# Patient Record
Sex: FEMALE | Race: WHITE | NOT HISPANIC OR LATINO | Employment: OTHER | URBAN - METROPOLITAN AREA
[De-identification: names, ages, dates, MRNs, and addresses within clinical notes are randomized per-mention and may not be internally consistent; named-entity substitution may affect disease eponyms.]

---

## 2017-01-03 ENCOUNTER — TELEPHONE (OUTPATIENT)
Dept: INTERNAL MEDICINE | Facility: CLINIC | Age: 82
End: 2017-01-03

## 2017-01-03 NOTE — TELEPHONE ENCOUNTER
Pt states sore throat started Friday night and hacking cough started this am, afraid it's going to go down into her chest, no temp, wants something called in. Pt notified she would need to come in as she's high risk per TGF.

## 2017-01-03 NOTE — TELEPHONE ENCOUNTER
----- Message from Hossein Winters sent at 1/3/2017 12:24 PM EST -----  Contact: Quita Kelly Complaint: Red, Sore Throat, tonsils swollen, coughing, stopped up, now runny nose, pls call something in to [facility] in [place].  Quita [phone]

## 2017-01-10 RX ORDER — DIAZEPAM 5 MG/1
2.5-5 TABLET ORAL NIGHTLY PRN
Qty: 30 TABLET | Refills: 0 | OUTPATIENT
Start: 2017-01-10 | End: 2017-02-10 | Stop reason: SDUPTHER

## 2017-01-12 ENCOUNTER — OFFICE VISIT (OUTPATIENT)
Dept: INTERNAL MEDICINE | Facility: CLINIC | Age: 82
End: 2017-01-12

## 2017-01-12 ENCOUNTER — APPOINTMENT (OUTPATIENT)
Dept: LAB | Facility: HOSPITAL | Age: 82
End: 2017-01-12
Attending: INTERNAL MEDICINE

## 2017-01-12 VITALS
BODY MASS INDEX: 24.8 KG/M2 | RESPIRATION RATE: 16 BRPM | TEMPERATURE: 98.9 F | OXYGEN SATURATION: 97 % | WEIGHT: 140 LBS | DIASTOLIC BLOOD PRESSURE: 80 MMHG | HEART RATE: 76 BPM | SYSTOLIC BLOOD PRESSURE: 144 MMHG

## 2017-01-12 DIAGNOSIS — F41.1 GENERALIZED ANXIETY DISORDER: ICD-10-CM

## 2017-01-12 DIAGNOSIS — E78.2 MIXED HYPERLIPIDEMIA: ICD-10-CM

## 2017-01-12 DIAGNOSIS — I10 ESSENTIAL HYPERTENSION: Primary | ICD-10-CM

## 2017-01-12 DIAGNOSIS — K29.30 CHRONIC SUPERFICIAL GASTRITIS WITHOUT BLEEDING: ICD-10-CM

## 2017-01-12 DIAGNOSIS — E11.9 TYPE 2 DIABETES MELLITUS WITHOUT COMPLICATION, WITHOUT LONG-TERM CURRENT USE OF INSULIN (HCC): ICD-10-CM

## 2017-01-12 LAB
ALBUMIN SERPL-MCNC: 4.6 G/DL (ref 3.2–4.8)
ALBUMIN/GLOB SERPL: 1.8 G/DL (ref 1.5–2.5)
ALP SERPL-CCNC: 84 U/L (ref 25–100)
ALT SERPL W P-5'-P-CCNC: 16 U/L (ref 7–40)
ANION GAP SERPL CALCULATED.3IONS-SCNC: 13 MMOL/L (ref 3–11)
ARTICHOKE IGE QN: 129 MG/DL (ref 0–130)
AST SERPL-CCNC: 28 U/L (ref 0–33)
BILIRUB SERPL-MCNC: 0.6 MG/DL (ref 0.3–1.2)
BUN BLD-MCNC: 11 MG/DL (ref 9–23)
BUN/CREAT SERPL: 12.2 (ref 7–25)
CALCIUM SPEC-SCNC: 10.1 MG/DL (ref 8.7–10.4)
CHLORIDE SERPL-SCNC: 105 MMOL/L (ref 99–109)
CHOLEST SERPL-MCNC: 242 MG/DL (ref 0–200)
CO2 SERPL-SCNC: 27 MMOL/L (ref 20–31)
CREAT BLD-MCNC: 0.9 MG/DL (ref 0.6–1.3)
GFR SERPL CREATININE-BSD FRML MDRD: 59 ML/MIN/1.73
GLOBULIN UR ELPH-MCNC: 2.5 GM/DL
GLUCOSE BLD-MCNC: 102 MG/DL (ref 70–100)
HBA1C MFR BLD: 6.6 % (ref 4.8–5.6)
HDLC SERPL-MCNC: 77 MG/DL (ref 40–60)
POTASSIUM BLD-SCNC: 3.9 MMOL/L (ref 3.5–5.5)
PROT SERPL-MCNC: 7.1 G/DL (ref 5.7–8.2)
SODIUM BLD-SCNC: 145 MMOL/L (ref 132–146)
TRIGL SERPL-MCNC: 158 MG/DL (ref 0–150)

## 2017-01-12 PROCEDURE — 99214 OFFICE O/P EST MOD 30 MIN: CPT | Performed by: INTERNAL MEDICINE

## 2017-01-12 PROCEDURE — 83036 HEMOGLOBIN GLYCOSYLATED A1C: CPT | Performed by: INTERNAL MEDICINE

## 2017-01-12 PROCEDURE — 36415 COLL VENOUS BLD VENIPUNCTURE: CPT | Performed by: INTERNAL MEDICINE

## 2017-01-12 PROCEDURE — 80053 COMPREHEN METABOLIC PANEL: CPT | Performed by: INTERNAL MEDICINE

## 2017-01-12 PROCEDURE — 80061 LIPID PANEL: CPT | Performed by: INTERNAL MEDICINE

## 2017-01-12 NOTE — PROGRESS NOTES
Subjective   Kasey Bass is a 89 y.o. female.     History of Present Illness     The patient has many decades of severe refractory anxiety.  She has found that only Valium helps her control anxiety.  She was nicotine dependent until 1966 but is been off tobacco now for 50 years.  In the 1970s she began on Valium 5 mg and still feels this is the only dose that adequately helps her.  She has been offered SSRIs.  Declines to take these medications.  She has periods of peak anxiety that only the 5 mg Valium helps.  She has tried BuSpar but finds it ineffective.  She feels her sleep quality has been adequate without extra medication.  She has not had a dysphoric mood.  She has been stable at home with her  and support from her grown daughter.    The following portions of the patient's history were reviewed and updated as appropriate: allergies, current medications, past family history, past medical history, past social history, past surgical history and problem list.    Review of Systems   Constitutional: Negative for appetite change and fatigue.   HENT: Negative for ear pain and sore throat.    Respiratory: Positive for shortness of breath. Negative for cough and wheezing.         Mild intermittent asthma activity   Cardiovascular: Negative for chest pain and palpitations.   Gastrointestinal: Negative for abdominal pain and nausea.        Minimal GERD activity   Musculoskeletal: Negative for arthralgias, back pain and gait problem.   Neurological: Negative for dizziness, weakness and headaches.   Psychiatric/Behavioral: Positive for sleep disturbance. Negative for dysphoric mood. The patient is not nervous/anxious.        Objective   Blood pressure 144/80, pulse 76, temperature 98.9 °F (37.2 °C), temperature source Oral, resp. rate 16, weight 140 lb (63.5 kg), SpO2 97 %.    Physical Exam   Constitutional: She is oriented to person, place, and time. She appears well-developed and well-nourished. No distress.    Cardiovascular: Normal rate and regular rhythm.    Mild systolic murmur at apex   Pulmonary/Chest: Effort normal. She has no wheezes. She has no rales.   Breath sounds mildly depressed   Abdominal: Soft. Bowel sounds are normal.   Neurological: She is alert and oriented to person, place, and time. Coordination normal.   Skin: Skin is warm and dry. No rash noted.   Psychiatric: Her behavior is normal. Judgment and thought content normal.   Mild general anxiety   Nursing note and vitals reviewed.    Procedures  Assessment/Plan   Kasey was seen today for cough.    Diagnoses and all orders for this visit:    Essential hypertension    Generalized anxiety disorder    Chronic superficial gastritis without bleeding    Type 2 diabetes mellitus without complication, without long-term current use of insulin  -     Hemoglobin A1c    Mixed hyperlipidemia  -     Comprehensive Metabolic Panel  -     Lipid Panel      The patient has moderately severe anxiety.  The Valium doses and increasing risk to her day by day.  I've asked her to use a half a tablet as often as possible.  Another strategy will be to give her 2 mg tablets and have her limited dosing to 2 mg or 4 mg at a time.    The patient's chronic GERD symptoms are in reasonably good control.  Prevacid will pose an increasing risk for her with osteoporosis and memory disorders.  I've asked her to start dropping doses of Prevacid and will I on ranitidine and Pepto-Bismol for GI distress.    The patient's type 2 diabetes is in adequate control with a stable hemoglobin A1c of 6.6.  Her LDL cholesterol is not adequate at 129 and she is at high risk of stroke and heart attack.  We will discuss the addition of every other day atorvastatin next visit.    The patient's blood pressure appears to be in adequate control generally at 135-140.  We should consider a new trial of losartan at 25 mg daily due to her diabetes and her mitral valve disease.    Patient Instructions   1.  Start  diltiazem 120 mg daily - to improve blood pressure control.    2.  Start ranitidine 300 mg at bedtime - to suppress stomach acid.    3.  Decrease Prevacid 15 mg - Monday Wednesday Friday only - for long-term safety.    4.  Continue usual medicines and supplements - as listed.    5.  Follow well-balanced diet - with plenty of fruits and vegetables.  Limit sugary foods - one day weekly.    6.  The nurse will call with test results.    7.  Return in May - annual checkup fasting.    8.  Laboratory tests are acceptable and required no change in treatment.    9.  Consider trial on low-dose atorvastatin every other day next visit.    10.  Consider cutting Valium doses 2 mg to 4 mg when necessary.    Electronically signed Kamlesh Redding M.D.1/15/2017 12:19 PM

## 2017-01-12 NOTE — MR AVS SNAPSHOT
Kasey Bass   1/12/2017 2:00 PM   Office Visit    Provider:  Kamlesh Redding MD   Department:  [facility] INTERNAL MEDICINE   Dept Phone:  [phone]                Your Full Care Plan              Your Updated Medication List          This list is accurate as of: 1/12/17  2:59 PM.  Always use your most recent med list.                albuterol 108 (90 BASE) MCG/ACT inhaler   Commonly known as:  VENTOLIN HFA   Inhale 1 puff As Needed for wheezing (for cough, wheezing or shortness of breath).       aspirin 81 MG EC tablet       CALCIUM 500+D HIGH POTENCY 500-400 MG-UNIT per tablet   Generic drug:  Calcium Carbonate-Vitamin D       Co Q-10 200 MG capsule       diazePAM 5 MG tablet   Commonly known as:  VALIUM   Take 0.5-1 tablets by mouth At Night As Needed for anxiety.       diltiazem  MG 24 hr capsule   Commonly known as:  DILACOR XR   Take 1 capsule by mouth Daily.       FIBERCON 625 MG tablet   Generic drug:  polycarbophil       FISH OIL BURP-LESS 500 MG capsule       fluocinonide 0.05 % ointment   Commonly known as:  LIDEX       fluticasone 50 MCG/ACT nasal spray   Commonly known as:  FLONASE       lansoprazole 30 MG capsule   Commonly known as:  PREVACID       metFORMIN  MG 24 hr tablet   Commonly known as:  GLUCOPHAGE-XR   Take 1 tablet by mouth Every Morning.       MULTI VITAMIN DAILY tablet       nitroglycerin 0.4 MG SL tablet   Commonly known as:  NITROSTAT   Place 1 tablet under the tongue Every 5 (Five) Minutes As Needed for chest pain. Take no more than 3 doses in 15 minutes.       PEG 3350 powder       sodium chloride 0.65 % nasal spray   Commonly known as:  OCEAN       SYSTANE NIGHTTIME ointment       vitamin D3 5000 UNITS capsule capsule               We Performed the Following     Comprehensive Metabolic Panel     Hemoglobin A1c     Lipid Panel       You Were Diagnosed With        Codes Comments    Essential hypertension    -  Primary  ICD-10-CM: I10  ICD-9-CM: 401.9     Generalized anxiety disorder     ICD-10-CM: F41.1  ICD-9-CM: 300.02     Chronic superficial gastritis without bleeding     ICD-10-CM: K29.30  ICD-9-CM: 535.10     Type 2 diabetes mellitus without complication, without long-term current use of insulin     ICD-10-CM: E11.9  ICD-9-CM: 250.00     Mixed hyperlipidemia     ICD-10-CM: E78.2  ICD-9-CM: 272.2       Instructions    1.  Start diltiazem 120 mg daily - to improve blood pressure control.    2.  Start ranitidine 300 mg at bedtime - to suppress stomach acid.    3.  Decrease Prevacid 15 mg - Monday Wednesday Friday only - for long-term safety.    4.  Continue usual medicines and supplements - as listed.    5.  Follow well-balanced diet - with plenty of fruits and vegetables.  Limit sugary foods - one day weekly.    6.  The nurse will call with test results.    7.  Return in May - annual checkup fasting.     Patient Instructions History      MyChart Signup     Our records indicate that you have declined [facility] [employer] signup. If you would like to sign up for [employer], please email [email or web address] or call [phone] to obtain an activation code.             Other Info from Your Visit           Your Appointments     Jul 13, 2017  1:15 PM EDT   Follow Up with Jen Suresh MD   [facility] (--)    [address] [place] [address] [address] [place] [id number] [phone]           Arrive 15 minutes prior to appointment.              Allergies     Penicillins  Anaphylaxis    Streptomycin  Anaphylaxis    Valdecoxib Intolerance GI Intolerance    Hydrocodone-acetaminophen      Ciprofloxacin Allergy Rash    Diltiazem Unspecified Rash    Doxycycline Allergy Rash    Ibuprofen Unspecified Rash    Metoclopramide Intolerance Diarrhea    Naproxen Unspecified Rash    Nitrofurantoin Allergy Rash    Pneumococcal Polysaccharide Vaccine Allergy Rash    Pravastatin Intolerance GI  Intolerance    Propoxyphene Intolerance Nausea Only    Simvastatin Intolerance GI Intolerance    Tramadol Unspecified Rash      Reason for Visit     Cough           Vital Signs     Blood Pressure Pulse Temperature Respirations Weight Oxygen Saturation    144/80 (BP Location: Right arm, Patient Position: Sitting) 76 98.9 °F (37.2 °C) (Oral) 16 140 lb (63.5 kg) 97%    Body Mass Index Smoking Status                24.8 kg/m2 Former Smoker          Problems and Diagnoses Noted     Anxiety disorder    Inflammation of the stomach lining    High cholesterol or triglycerides    High blood pressure    Type 2 diabetes      No Longer an Issue     Type 2 diabetes mellitus, controlled

## 2017-01-12 NOTE — PATIENT INSTRUCTIONS
1.  Start diltiazem 120 mg daily - to improve blood pressure control.    2.  Start ranitidine 300 mg at bedtime - to suppress stomach acid.    3.  Decrease Prevacid 15 mg - Monday Wednesday Friday only - for long-term safety.    4.  Continue usual medicines and supplements - as listed.    5.  Follow well-balanced diet - with plenty of fruits and vegetables.  Limit sugary foods - one day weekly.    6.  The nurse will call with test results.    7.  Return in May - annual checkup fasting.

## 2017-01-15 RX ORDER — RANITIDINE 150 MG/1
1 TABLET ORAL NIGHTLY
COMMUNITY
End: 2017-01-15

## 2017-01-15 RX ORDER — RANITIDINE 300 MG/1
300 TABLET ORAL NIGHTLY
Qty: 90 TABLET | Refills: 1 | Status: SHIPPED | OUTPATIENT
Start: 2017-01-15 | End: 2018-02-27

## 2017-01-15 RX ORDER — LANSOPRAZOLE 15 MG/1
15 CAPSULE, DELAYED RELEASE ORAL 3 TIMES WEEKLY
COMMUNITY
Start: 2017-01-15 | End: 2017-06-29

## 2017-01-19 ENCOUNTER — TELEPHONE (OUTPATIENT)
Dept: INTERNAL MEDICINE | Facility: CLINIC | Age: 82
End: 2017-01-19

## 2017-01-19 NOTE — TELEPHONE ENCOUNTER
Pt notified of labs - [facility] 6.6 the same as last time - no changes; need to try to take 1/2 Valium 5 mg and wait 1-2 hrs before taking other 1/2 per TGF.  Pt requests copy of labs mailed to her

## 2017-01-20 ENCOUNTER — TELEPHONE (OUTPATIENT)
Dept: INTERNAL MEDICINE | Facility: CLINIC | Age: 82
End: 2017-01-20

## 2017-01-20 NOTE — TELEPHONE ENCOUNTER
----- Message from Katelynn Carrion sent at 1/20/2017  1:50 PM EST -----  Contact: JOE CAMPBELL RICKY- PATIENT TOLD TO STOP DILTIAZEM, IS THERE SOMETHING ELSE SHE SHOULD BE TAKING INSTEAD? SHE TRIED CALLING DR. LOPEZ BUT CAN'T GET THROUGH. SHE HAS BEEN HAVING SOME CHEST PAIN ALONG WITH BACK PAIN ON LEFT SIDE.

## 2017-01-20 NOTE — TELEPHONE ENCOUNTER
I returned pt's call. She states was put on diltiazem per Dr Suresh but noticed on her AVS from last OX (1/12) that diltiazem was on her allergy list. She's asking if TGF would rx something else. Also she reports having left sided chest pain a day or 2 before her appt that lasted 30 min and went away after taking a 3rd nitro. When asked why she didn't report this to TGF at her appt, she states she forgot. Per TGF pt has been doing well on diltiazem and that he removed it off allergy list. I advised her per TGF to continue diltiazem but pt states she rather not bc she doesn't like the way it makes her feel but states she will. I told her to call next week if still bothersome. If she continues to have chest pain, she would need to come in for evaluation and possible stress test per TGF.

## 2017-02-10 ENCOUNTER — TELEPHONE (OUTPATIENT)
Dept: INTERNAL MEDICINE | Facility: CLINIC | Age: 82
End: 2017-02-10

## 2017-02-10 RX ORDER — DIAZEPAM 5 MG/1
2.5-5 TABLET ORAL NIGHTLY PRN
Qty: 30 TABLET | Refills: 0 | OUTPATIENT
Start: 2017-02-10 | End: 2017-03-10 | Stop reason: SDUPTHER

## 2017-02-10 NOTE — TELEPHONE ENCOUNTER
----- Message from Katelynn Carrion sent at 2/9/2017  3:02 PM EST -----  Contact: MAYA  [facility] MED RENEWAL- VALIUM- [facility]

## 2017-02-17 ENCOUNTER — TELEPHONE (OUTPATIENT)
Dept: INTERNAL MEDICINE | Facility: CLINIC | Age: 82
End: 2017-02-17

## 2017-02-17 NOTE — TELEPHONE ENCOUNTER
----- Message from Katelynn Carrion sent at 2/17/2017  1:59 PM EST -----  Contact: MAYA  PATIENT WAS PRESCRIBED DILITIAZEM- HAVING ADVERSE SIDE EFFECTS AND HAS STOPPED IT. IS THERE SOMETHING ELSE SIMILAR THAT SHE CAN TAKE? [phone]      Pt has read the drug information sheet for diltiazem and has listed off numerous complaints (unusual dreams, stomach, constipation, h/a, etc) since starting it. I advised her to make an appt with TGF.  is to call pt back to schedule.

## 2017-03-10 ENCOUNTER — TELEPHONE (OUTPATIENT)
Dept: INTERNAL MEDICINE | Facility: CLINIC | Age: 82
End: 2017-03-10

## 2017-03-10 RX ORDER — DIAZEPAM 5 MG/1
2.5-5 TABLET ORAL NIGHTLY PRN
Qty: 30 TABLET | Refills: 0 | OUTPATIENT
Start: 2017-03-10 | End: 2017-04-07 | Stop reason: SDUPTHER

## 2017-03-10 NOTE — TELEPHONE ENCOUNTER
----- Message from Hossein Winters sent at 3/10/2017 10:13 AM EST -----  Contact: MAYA  MED RENEWAL:  VALIUM, [facility].  THEY WERE SUPPOSED TO FAX SOMETHING THIS MORNING.  SHE ONLY HAS 2 LEFT, PLEASE PROCESS IT TODAY.  MAYA [phone]

## 2017-04-07 ENCOUNTER — TELEPHONE (OUTPATIENT)
Dept: INTERNAL MEDICINE | Facility: CLINIC | Age: 82
End: 2017-04-07

## 2017-04-07 RX ORDER — DIAZEPAM 5 MG/1
2.5-5 TABLET ORAL NIGHTLY PRN
Qty: 30 TABLET | Refills: 0 | OUTPATIENT
Start: 2017-04-07 | End: 2017-05-05 | Stop reason: SDUPTHER

## 2017-05-05 RX ORDER — DIAZEPAM 5 MG/1
TABLET ORAL
Qty: 30 TABLET | Refills: 0 | OUTPATIENT
Start: 2017-05-05 | End: 2017-06-06 | Stop reason: SDUPTHER

## 2017-06-05 RX ORDER — DIAZEPAM 5 MG/1
TABLET ORAL
Qty: 30 TABLET | Refills: 0 | Status: CANCELLED | OUTPATIENT
Start: 2017-06-05

## 2017-06-06 ENCOUNTER — TELEPHONE (OUTPATIENT)
Dept: INTERNAL MEDICINE | Facility: CLINIC | Age: 82
End: 2017-06-06

## 2017-06-06 RX ORDER — DIAZEPAM 5 MG/1
TABLET ORAL
Qty: 30 TABLET | Refills: 0 | OUTPATIENT
Start: 2017-06-06 | End: 2017-06-30 | Stop reason: SDUPTHER

## 2017-06-06 NOTE — TELEPHONE ENCOUNTER
----- Message from Katelynn Carrion sent at 6/5/2017  1:07 PM EDT -----  Contact: MAYA [facility]

## 2017-06-29 ENCOUNTER — LAB (OUTPATIENT)
Dept: LAB | Facility: HOSPITAL | Age: 82
End: 2017-06-29

## 2017-06-29 ENCOUNTER — HOSPITAL ENCOUNTER (OUTPATIENT)
Dept: GENERAL RADIOLOGY | Facility: HOSPITAL | Age: 82
Discharge: HOME | End: 2017-06-29
Attending: INTERNAL MEDICINE | Admitting: INTERNAL MEDICINE

## 2017-06-29 ENCOUNTER — OFFICE VISIT (OUTPATIENT)
Dept: INTERNAL MEDICINE | Facility: CLINIC | Age: 82
End: 2017-06-29

## 2017-06-29 VITALS
TEMPERATURE: 98.3 F | OXYGEN SATURATION: 96 % | HEART RATE: 80 BPM | SYSTOLIC BLOOD PRESSURE: 144 MMHG | WEIGHT: 138 LBS | BODY MASS INDEX: 25.4 KG/M2 | DIASTOLIC BLOOD PRESSURE: 90 MMHG | HEIGHT: 62 IN | RESPIRATION RATE: 16 BRPM

## 2017-06-29 DIAGNOSIS — J45.20 MILD INTERMITTENT ASTHMA WITHOUT COMPLICATION: ICD-10-CM

## 2017-06-29 DIAGNOSIS — K31.84 GASTROPARESIS: ICD-10-CM

## 2017-06-29 DIAGNOSIS — E55.9 VITAMIN D DEFICIENCY: ICD-10-CM

## 2017-06-29 DIAGNOSIS — E78.2 MIXED HYPERLIPIDEMIA: ICD-10-CM

## 2017-06-29 DIAGNOSIS — H91.93 HEARING IMPAIRMENT, BILATERAL: ICD-10-CM

## 2017-06-29 DIAGNOSIS — M54.50 CHRONIC BILATERAL LOW BACK PAIN WITHOUT SCIATICA: ICD-10-CM

## 2017-06-29 DIAGNOSIS — F41.1 GENERALIZED ANXIETY DISORDER: ICD-10-CM

## 2017-06-29 DIAGNOSIS — F51.01 PRIMARY INSOMNIA: ICD-10-CM

## 2017-06-29 DIAGNOSIS — K59.01 SLOW TRANSIT CONSTIPATION: ICD-10-CM

## 2017-06-29 DIAGNOSIS — E53.9 VITAMIN B DEFICIENCY: ICD-10-CM

## 2017-06-29 DIAGNOSIS — G89.29 CHRONIC BILATERAL LOW BACK PAIN WITHOUT SCIATICA: ICD-10-CM

## 2017-06-29 DIAGNOSIS — R10.12 LEFT UPPER QUADRANT PAIN: ICD-10-CM

## 2017-06-29 DIAGNOSIS — F32.9 REACTIVE DEPRESSION: ICD-10-CM

## 2017-06-29 DIAGNOSIS — I10 ESSENTIAL HYPERTENSION: ICD-10-CM

## 2017-06-29 DIAGNOSIS — E11.9 TYPE 2 DIABETES MELLITUS WITHOUT COMPLICATION, WITHOUT LONG-TERM CURRENT USE OF INSULIN (HCC): Primary | ICD-10-CM

## 2017-06-29 DIAGNOSIS — I25.119 ATHEROSCLEROSIS OF NATIVE CORONARY ARTERY OF NATIVE HEART WITH ANGINA PECTORIS (HCC): ICD-10-CM

## 2017-06-29 DIAGNOSIS — N95.2 ATROPHIC VAGINITIS: ICD-10-CM

## 2017-06-29 PROBLEM — R10.9 ABDOMINAL PAIN: Status: ACTIVE | Noted: 2017-06-29

## 2017-06-29 PROBLEM — F32.A DEPRESSION: Status: ACTIVE | Noted: 2017-06-29

## 2017-06-29 LAB
25(OH)D3 SERPL-MCNC: 29.1 NG/ML
ALBUMIN SERPL-MCNC: 4.6 G/DL (ref 3.2–4.8)
ALBUMIN/GLOB SERPL: 2 G/DL (ref 1.5–2.5)
ALP SERPL-CCNC: 83 U/L (ref 25–100)
ALT SERPL W P-5'-P-CCNC: 17 U/L (ref 7–40)
AMYLASE SERPL-CCNC: 78 U/L (ref 30–118)
ANION GAP SERPL CALCULATED.3IONS-SCNC: 9 MMOL/L (ref 3–11)
ARTICHOKE IGE QN: 146 MG/DL (ref 0–130)
AST SERPL-CCNC: 25 U/L (ref 0–33)
BACTERIA UR QL AUTO: ABNORMAL /HPF
BASOPHILS # BLD AUTO: 0.04 10*3/MM3 (ref 0–0.2)
BASOPHILS NFR BLD AUTO: 0.5 % (ref 0–1)
BILIRUB SERPL-MCNC: 0.5 MG/DL (ref 0.3–1.2)
BILIRUB UR QL STRIP: NEGATIVE
BUN BLD-MCNC: 12 MG/DL (ref 9–23)
BUN/CREAT SERPL: 13.3 (ref 7–25)
CALCIUM SPEC-SCNC: 10.1 MG/DL (ref 8.7–10.4)
CHLORIDE SERPL-SCNC: 105 MMOL/L (ref 99–109)
CHOLEST SERPL-MCNC: 238 MG/DL (ref 0–200)
CLARITY UR: ABNORMAL
CO2 SERPL-SCNC: 29 MMOL/L (ref 20–31)
COLOR UR: YELLOW
CREAT BLD-MCNC: 0.9 MG/DL (ref 0.6–1.3)
CRP SERPL-MCNC: 0.12 MG/DL (ref 0–1)
DEPRECATED RDW RBC AUTO: 45.7 FL (ref 37–54)
EOSINOPHIL # BLD AUTO: 0.05 10*3/MM3 (ref 0–0.3)
EOSINOPHIL NFR BLD AUTO: 0.6 % (ref 0–3)
ERYTHROCYTE [DISTWIDTH] IN BLOOD BY AUTOMATED COUNT: 14 % (ref 11.3–14.5)
ERYTHROCYTE [SEDIMENTATION RATE] IN BLOOD: 9 MM/HR (ref 0–30)
GFR SERPL CREATININE-BSD FRML MDRD: 59 ML/MIN/1.73
GLOBULIN UR ELPH-MCNC: 2.3 GM/DL
GLUCOSE BLD-MCNC: 102 MG/DL (ref 70–100)
GLUCOSE UR STRIP-MCNC: NEGATIVE MG/DL
HBA1C MFR BLD: 6.5 % (ref 4.8–5.6)
HCT VFR BLD AUTO: 43 % (ref 34.5–44)
HDLC SERPL-MCNC: 71 MG/DL (ref 40–60)
HGB BLD-MCNC: 14 G/DL (ref 11.5–15.5)
HGB UR QL STRIP.AUTO: NEGATIVE
HYALINE CASTS UR QL AUTO: ABNORMAL /LPF
IMM GRANULOCYTES # BLD: 0.02 10*3/MM3 (ref 0–0.03)
IMM GRANULOCYTES NFR BLD: 0.3 % (ref 0–0.6)
KETONES UR QL STRIP: NEGATIVE
LEUKOCYTE ESTERASE UR QL STRIP.AUTO: ABNORMAL
LYMPHOCYTES # BLD AUTO: 2.33 10*3/MM3 (ref 0.6–4.8)
LYMPHOCYTES NFR BLD AUTO: 30.2 % (ref 24–44)
MCH RBC QN AUTO: 28.8 PG (ref 27–31)
MCHC RBC AUTO-ENTMCNC: 32.6 G/DL (ref 32–36)
MCV RBC AUTO: 88.5 FL (ref 80–99)
MONOCYTES # BLD AUTO: 0.45 10*3/MM3 (ref 0–1)
MONOCYTES NFR BLD AUTO: 5.8 % (ref 0–12)
NEUTROPHILS # BLD AUTO: 4.82 10*3/MM3 (ref 1.5–8.3)
NEUTROPHILS NFR BLD AUTO: 62.6 % (ref 41–71)
NITRITE UR QL STRIP: POSITIVE
PH UR STRIP.AUTO: 6.5 [PH] (ref 5–8)
PLATELET # BLD AUTO: 185 10*3/MM3 (ref 150–450)
PMV BLD AUTO: 9.9 FL (ref 6–12)
POTASSIUM BLD-SCNC: 3.8 MMOL/L (ref 3.5–5.5)
PROT SERPL-MCNC: 6.9 G/DL (ref 5.7–8.2)
PROT UR QL STRIP: NEGATIVE
RBC # BLD AUTO: 4.86 10*6/MM3 (ref 3.89–5.14)
RBC # UR: ABNORMAL /HPF
REF LAB TEST METHOD: ABNORMAL
SODIUM BLD-SCNC: 143 MMOL/L (ref 132–146)
SP GR UR STRIP: 1.01 (ref 1–1.03)
SQUAMOUS #/AREA URNS HPF: ABNORMAL /HPF
TRIGL SERPL-MCNC: 141 MG/DL (ref 0–150)
TSH SERPL DL<=0.05 MIU/L-ACNC: 1.75 MIU/ML (ref 0.35–5.35)
UROBILINOGEN UR QL STRIP: ABNORMAL
VIT B12 BLD-MCNC: 742 PG/ML (ref 211–911)
WBC NRBC COR # BLD: 7.71 10*3/MM3 (ref 3.5–10.8)
WBC UR QL AUTO: ABNORMAL /HPF

## 2017-06-29 PROCEDURE — 81001 URINALYSIS AUTO W/SCOPE: CPT | Performed by: INTERNAL MEDICINE

## 2017-06-29 PROCEDURE — G0101 CA SCREEN;PELVIC/BREAST EXAM: HCPCS | Performed by: INTERNAL MEDICINE

## 2017-06-29 PROCEDURE — 83036 HEMOGLOBIN GLYCOSYLATED A1C: CPT | Performed by: INTERNAL MEDICINE

## 2017-06-29 PROCEDURE — 85652 RBC SED RATE AUTOMATED: CPT | Performed by: INTERNAL MEDICINE

## 2017-06-29 PROCEDURE — 74000 HC ABDOMEN KUB: CPT

## 2017-06-29 PROCEDURE — 80061 LIPID PANEL: CPT | Performed by: INTERNAL MEDICINE

## 2017-06-29 PROCEDURE — 85025 COMPLETE CBC W/AUTO DIFF WBC: CPT | Performed by: INTERNAL MEDICINE

## 2017-06-29 PROCEDURE — 99215 OFFICE O/P EST HI 40 MIN: CPT | Performed by: INTERNAL MEDICINE

## 2017-06-29 PROCEDURE — 93000 ELECTROCARDIOGRAM COMPLETE: CPT | Performed by: INTERNAL MEDICINE

## 2017-06-29 PROCEDURE — 36415 COLL VENOUS BLD VENIPUNCTURE: CPT

## 2017-06-29 PROCEDURE — 84443 ASSAY THYROID STIM HORMONE: CPT | Performed by: INTERNAL MEDICINE

## 2017-06-29 PROCEDURE — 82607 VITAMIN B-12: CPT | Performed by: INTERNAL MEDICINE

## 2017-06-29 PROCEDURE — 82306 VITAMIN D 25 HYDROXY: CPT | Performed by: INTERNAL MEDICINE

## 2017-06-29 PROCEDURE — 86140 C-REACTIVE PROTEIN: CPT | Performed by: INTERNAL MEDICINE

## 2017-06-29 PROCEDURE — 82150 ASSAY OF AMYLASE: CPT | Performed by: INTERNAL MEDICINE

## 2017-06-29 PROCEDURE — 80053 COMPREHEN METABOLIC PANEL: CPT | Performed by: INTERNAL MEDICINE

## 2017-07-03 ENCOUNTER — TELEPHONE (OUTPATIENT)
Dept: INTERNAL MEDICINE | Facility: CLINIC | Age: 82
End: 2017-07-03

## 2017-07-03 RX ORDER — DIAZEPAM 5 MG/1
TABLET ORAL
Qty: 30 TABLET | Refills: 0 | OUTPATIENT
Start: 2017-07-03 | End: 2017-08-07 | Stop reason: SDUPTHER

## 2017-07-03 NOTE — TELEPHONE ENCOUNTER
Called pt - per TGF need a tap water enema and to push the miralax. Also to take 2 cranberry pills every am and drink plenty of water. Pt verb understanding.

## 2017-07-03 NOTE — TELEPHONE ENCOUNTER
Per TGF called pierre Sutherland whose a nurse - notified her of above phone call with pt, and that per TGF pt needs a tap water enema and needs to PUSH THE MIRALAX, also to take 2 cranberry pills EVERY AM and drink plenty of water for urinary. She will relay this to the pt, but she had asked that I also call the pt.

## 2017-07-03 NOTE — TELEPHONE ENCOUNTER
Called labs to pt per TGF  -Hemoglobin A1c 6.5% indicates adequate control of blood sugar.  -Vitamin D level mildly low at 29. Ensure daily vitamin D3 5000 units daily. Pt states just started back on 2000 units daily. Needs to be on 5000 units. Ok per pt.  -Urinalysis shows few extra white blood cells indicating possible cystitis. Antibiotics only for fever or significant bladder symptoms. Consider cranberry pills 2 tablets daily. Pt states takes 1 cranberry pill most mornings but c/o vaginal irritation when urinates for past week.  -Other laboratory tests are acceptable and require no change in treatment.    Pt notified abdominal x-ray again shows fecal stasis per TGF. Pt states took 1 capful miralax in 8 oz juice - every hour starting at 9 am Friday and had a small loose BM with lots of gas that afternoon, but states bowels still not moving well. States took 1/2 capful in 4 oz juice Sat and Sun am, and had again a small loose BM with a lot of gas each day. Had the same this am.

## 2017-07-06 LAB
CYTOLOGIST CVX/VAG CYTO: NORMAL
DX ICD CODE: NORMAL
HIV 1 & 2 AB SER-IMP: NORMAL
Lab: NORMAL
OTHER STN SPEC: NORMAL
PATH REPORT.FINAL DX SPEC: NORMAL
STAT OF ADQ CVX/VAG CYTO-IMP: NORMAL

## 2017-08-07 RX ORDER — DIAZEPAM 5 MG/1
TABLET ORAL
Qty: 30 TABLET | Refills: 0 | OUTPATIENT
Start: 2017-08-07 | End: 2017-09-05 | Stop reason: SDUPTHER

## 2017-09-05 RX ORDER — DIAZEPAM 5 MG/1
TABLET ORAL
Qty: 30 TABLET | Refills: 0 | OUTPATIENT
Start: 2017-09-05 | End: 2017-10-03 | Stop reason: SDUPTHER

## 2017-09-06 ENCOUNTER — TELEPHONE (OUTPATIENT)
Dept: INTERNAL MEDICINE | Facility: CLINIC | Age: 82
End: 2017-09-06

## 2017-09-06 NOTE — TELEPHONE ENCOUNTER
----- Message from Hossein Winters sent at 9/6/2017 10:53 AM EDT -----  Contact: Quita Kelly complaint:  Stomach issues, not sleeping, left side painful, worse at night.  Pharmacist seems to think it's the metformin.  She didn't take it yesterday or today.  Quita [phone]

## 2017-09-06 NOTE — TELEPHONE ENCOUNTER
I called pt  back.  States stomach pain started ~the  time she last filled glucophage RX.  Denies temps. States lateral stomach area very painful to palpation. Pt talked w her pharmacist & was told that glucophage could cause stomach pain, so she hasn't taken it for last 2 days.  I asked her if she did ok on trade name metformin in past and she confirmed she has.   Scheduled pt for ox in AM for RICKY plasencia.

## 2017-09-07 ENCOUNTER — OFFICE VISIT (OUTPATIENT)
Dept: INTERNAL MEDICINE | Facility: CLINIC | Age: 82
End: 2017-09-07

## 2017-09-07 VITALS
DIASTOLIC BLOOD PRESSURE: 84 MMHG | BODY MASS INDEX: 25.42 KG/M2 | RESPIRATION RATE: 16 BRPM | TEMPERATURE: 98.6 F | WEIGHT: 139 LBS | OXYGEN SATURATION: 97 % | HEART RATE: 88 BPM | SYSTOLIC BLOOD PRESSURE: 164 MMHG

## 2017-09-07 DIAGNOSIS — I10 ESSENTIAL HYPERTENSION: ICD-10-CM

## 2017-09-07 DIAGNOSIS — K59.01 SLOW TRANSIT CONSTIPATION: ICD-10-CM

## 2017-09-07 DIAGNOSIS — J45.20 MILD INTERMITTENT ASTHMA WITHOUT COMPLICATION: ICD-10-CM

## 2017-09-07 DIAGNOSIS — E11.9 TYPE 2 DIABETES MELLITUS WITHOUT COMPLICATION, WITHOUT LONG-TERM CURRENT USE OF INSULIN (HCC): ICD-10-CM

## 2017-09-07 DIAGNOSIS — E78.2 MIXED HYPERLIPIDEMIA: ICD-10-CM

## 2017-09-07 DIAGNOSIS — R10.12 LEFT UPPER QUADRANT PAIN: Primary | ICD-10-CM

## 2017-09-07 LAB
ALBUMIN SERPL-MCNC: 4.3 G/DL (ref 3.2–4.8)
ALBUMIN/GLOB SERPL: 1.8 G/DL (ref 1.5–2.5)
ALP SERPL-CCNC: 79 U/L (ref 25–100)
ALT SERPL W P-5'-P-CCNC: 15 U/L (ref 7–40)
ANION GAP SERPL CALCULATED.3IONS-SCNC: 6 MMOL/L (ref 3–11)
ARTICHOKE IGE QN: 139 MG/DL (ref 0–130)
AST SERPL-CCNC: 22 U/L (ref 0–33)
BACTERIA UR QL AUTO: ABNORMAL /HPF
BASOPHILS # BLD AUTO: 0.03 10*3/MM3 (ref 0–0.2)
BASOPHILS NFR BLD AUTO: 0.5 % (ref 0–1)
BILIRUB SERPL-MCNC: 0.5 MG/DL (ref 0.3–1.2)
BILIRUB UR QL STRIP: NEGATIVE
BUN BLD-MCNC: 13 MG/DL (ref 9–23)
BUN/CREAT SERPL: 13 (ref 7–25)
CALCIUM SPEC-SCNC: 10 MG/DL (ref 8.7–10.4)
CHLORIDE SERPL-SCNC: 109 MMOL/L (ref 99–109)
CHOLEST SERPL-MCNC: 236 MG/DL (ref 0–200)
CLARITY UR: ABNORMAL
CO2 SERPL-SCNC: 27 MMOL/L (ref 20–31)
COLOR UR: YELLOW
CREAT BLD-MCNC: 1 MG/DL (ref 0.6–1.3)
CRP SERPL-MCNC: 0.12 MG/DL (ref 0–1)
DEPRECATED RDW RBC AUTO: 41.7 FL (ref 37–54)
EOSINOPHIL # BLD AUTO: 0.04 10*3/MM3 (ref 0–0.3)
EOSINOPHIL NFR BLD AUTO: 0.7 % (ref 0–3)
ERYTHROCYTE [DISTWIDTH] IN BLOOD BY AUTOMATED COUNT: 13.1 % (ref 11.3–14.5)
GFR SERPL CREATININE-BSD FRML MDRD: 52 ML/MIN/1.73
GLOBULIN UR ELPH-MCNC: 2.4 GM/DL
GLUCOSE BLD-MCNC: 133 MG/DL (ref 70–100)
GLUCOSE UR STRIP-MCNC: ABNORMAL MG/DL
HBA1C MFR BLD: 6.1 % (ref 4.8–5.6)
HCT VFR BLD AUTO: 40.5 % (ref 34.5–44)
HDLC SERPL-MCNC: 69 MG/DL (ref 40–60)
HGB BLD-MCNC: 13.4 G/DL (ref 11.5–15.5)
HGB UR QL STRIP.AUTO: NEGATIVE
HYALINE CASTS UR QL AUTO: ABNORMAL /LPF
IMM GRANULOCYTES # BLD: 0.01 10*3/MM3 (ref 0–0.03)
IMM GRANULOCYTES NFR BLD: 0.2 % (ref 0–0.6)
KETONES UR QL STRIP: NEGATIVE
LEUKOCYTE ESTERASE UR QL STRIP.AUTO: ABNORMAL
LYMPHOCYTES # BLD AUTO: 1.91 10*3/MM3 (ref 0.6–4.8)
LYMPHOCYTES NFR BLD AUTO: 32.9 % (ref 24–44)
MCH RBC QN AUTO: 28.5 PG (ref 27–31)
MCHC RBC AUTO-ENTMCNC: 33.1 G/DL (ref 32–36)
MCV RBC AUTO: 86.2 FL (ref 80–99)
MONOCYTES # BLD AUTO: 0.47 10*3/MM3 (ref 0–1)
MONOCYTES NFR BLD AUTO: 8.1 % (ref 0–12)
NEUTROPHILS # BLD AUTO: 3.34 10*3/MM3 (ref 1.5–8.3)
NEUTROPHILS NFR BLD AUTO: 57.6 % (ref 41–71)
NITRITE UR QL STRIP: NEGATIVE
PH UR STRIP.AUTO: 6 [PH] (ref 5–8)
PLATELET # BLD AUTO: 168 10*3/MM3 (ref 150–450)
PMV BLD AUTO: 9.4 FL (ref 6–12)
POTASSIUM BLD-SCNC: 5.1 MMOL/L (ref 3.5–5.5)
PROT SERPL-MCNC: 6.7 G/DL (ref 5.7–8.2)
PROT UR QL STRIP: NEGATIVE
RBC # BLD AUTO: 4.7 10*6/MM3 (ref 3.89–5.14)
RBC # UR: ABNORMAL /HPF
REF LAB TEST METHOD: ABNORMAL
SODIUM BLD-SCNC: 142 MMOL/L (ref 132–146)
SP GR UR STRIP: 1.01 (ref 1–1.03)
SQUAMOUS #/AREA URNS HPF: ABNORMAL /HPF
TRIGL SERPL-MCNC: 102 MG/DL (ref 0–150)
UROBILINOGEN UR QL STRIP: ABNORMAL
WBC NRBC COR # BLD: 5.8 10*3/MM3 (ref 3.5–10.8)
WBC UR QL AUTO: ABNORMAL /HPF

## 2017-09-07 PROCEDURE — 36415 COLL VENOUS BLD VENIPUNCTURE: CPT | Performed by: INTERNAL MEDICINE

## 2017-09-07 PROCEDURE — 80061 LIPID PANEL: CPT | Performed by: INTERNAL MEDICINE

## 2017-09-07 PROCEDURE — 81001 URINALYSIS AUTO W/SCOPE: CPT | Performed by: INTERNAL MEDICINE

## 2017-09-07 PROCEDURE — 83036 HEMOGLOBIN GLYCOSYLATED A1C: CPT | Performed by: INTERNAL MEDICINE

## 2017-09-07 PROCEDURE — 85025 COMPLETE CBC W/AUTO DIFF WBC: CPT | Performed by: INTERNAL MEDICINE

## 2017-09-07 PROCEDURE — 87086 URINE CULTURE/COLONY COUNT: CPT | Performed by: INTERNAL MEDICINE

## 2017-09-07 PROCEDURE — 80053 COMPREHEN METABOLIC PANEL: CPT | Performed by: INTERNAL MEDICINE

## 2017-09-07 PROCEDURE — 86140 C-REACTIVE PROTEIN: CPT | Performed by: INTERNAL MEDICINE

## 2017-09-07 PROCEDURE — 99214 OFFICE O/P EST MOD 30 MIN: CPT | Performed by: INTERNAL MEDICINE

## 2017-09-07 RX ORDER — MAGNESIUM HYDROXIDE/ALUMINUM HYDROXICE/SIMETHICONE 120; 1200; 1200 MG/30ML; MG/30ML; MG/30ML
15 SUSPENSION ORAL DAILY PRN
COMMUNITY

## 2017-09-07 RX ORDER — DICYCLOMINE HYDROCHLORIDE 10 MG/1
10 CAPSULE ORAL 3 TIMES DAILY PRN
Qty: 30 CAPSULE | Refills: 3 | Status: SHIPPED | OUTPATIENT
Start: 2017-09-07 | End: 2019-01-23

## 2017-09-07 NOTE — PROGRESS NOTES
Subjective   Kasey Bass is a 89 y.o. female.     History of Present Illness     The patient's had 2 weeks of increasing abdominal pain.  Last week she had an episode where she had intense left-sided abdominal pain for 2 hours in the evening.  She also must went to the emergency department.  She has had no fevers chills or sweats.  She's had nose nausea or vomiting.  She has had intermittent constipation and diarrhea.  She has had no burning on urination.  She does have a history of gastroparesis and gluten sensitivity and neuropathy.  She tries to stay on a low gluten diet.    The following portions of the patient's history were reviewed and updated as appropriate: allergies, current medications, past family history, past medical history, past social history, past surgical history and problem list.    Review of Systems   Constitutional: Negative for appetite change, chills, fatigue and fever.   HENT: Negative for ear pain and sore throat.    Respiratory: Negative for cough and shortness of breath.    Cardiovascular: Negative for chest pain and palpitations.   Gastrointestinal: Positive for abdominal distention, abdominal pain, constipation and diarrhea. Negative for blood in stool and nausea.   Genitourinary: Negative for dysuria and urgency.   Musculoskeletal: Negative for arthralgias and back pain.   Neurological: Negative for dizziness and headaches.       Objective   Blood pressure 164/84, pulse 88, temperature 98.6 °F (37 °C), temperature source Oral, resp. rate 16, weight 139 lb (63 kg), SpO2 97 %.    Physical Exam   Constitutional: She is oriented to person, place, and time. She appears well-developed and well-nourished. No distress.   HENT:   Right Ear: External ear normal.   Left Ear: External ear normal.   Mouth/Throat: Oropharynx is clear and moist.   Eyes: EOM are normal. Pupils are equal, round, and reactive to light.   Neck: Normal range of motion. Neck supple. No JVD present.   Cardiovascular:  Normal rate and regular rhythm.    Systolic murmur at apex   Pulmonary/Chest: Effort normal and breath sounds normal. She has no wheezes. She has no rales.   Abdominal: Soft. Bowel sounds are normal. She exhibits no distension and no mass. There is no rebound and no guarding.   Mild generalized tenderness on deep palpation.  Bowel Sounds hyperactive   Lymphadenopathy:     She has no cervical adenopathy.   Neurological: She is alert and oriented to person, place, and time. She exhibits normal muscle tone. Coordination normal.   Skin: Skin is warm and dry. No rash noted.   Psychiatric: She has a normal mood and affect. Her behavior is normal. Judgment and thought content normal.   Nursing note and vitals reviewed.    Procedures  Assessment/Plan   Kasey was seen today for abdominal pain.    Diagnoses and all orders for this visit:    Left upper quadrant pain  -     CBC & Differential  -     Urinalysis With / Culture If Indicated  -     C-reactive Protein  -     CBC Auto Differential  -     Urinalysis, Microscopic Only; Future  -     Urinalysis, Microscopic Only  -     Urine Culture; Future  -     Urine Culture    Mixed hyperlipidemia  -     Comprehensive Metabolic Panel  -     Lipid Panel    Essential hypertension    Type 2 diabetes mellitus without complication, without long-term current use of insulin  -     Hemoglobin A1c    Mild intermittent asthma without complication    Slow transit constipation    Other orders  -     dicyclomine (BENTYL) 10 MG capsule; Take 1 capsule by mouth 3 (Three) Times a Day As Needed (Cramps).    The patient's laboratory tests indicate no evidence of active infection.  Her presentation is most consistent with acute spastic colon which may be partly diet mediated.  She should respond to dicyclomine.    The patient's diabetic control is excellent with hemoglobin A1c of 6.1%.  She should continue on metformin 1 tablet daily although this may be causing some degree of GI distress.    The  patient has marked chronic GI disease with gastroparesis, gluten sensitivity, and slow transit constipation.  I've asked her to be regular with FiberCon and MiraLAX to promote even bowel regularity.     Patient Instructions   1.  Continue usual medicines and supplements - as listed.    2.  Use dicyclomine - up to 3 capsules daily - for stomach cramps.    3.  Use MiraLAX every day - 1/2 capful - to keep bowels regular.    4.  Speak to nurse tomorrow afternoon - about test results.    5.  Return visit October 2 - nonfasting checkup.    6.  Blood glucose elevated 133.  Hemoglobin A1c indicates excellent diabetic control at 6.1%.    7.  LDL cholesterol mildly elevated 139.    8.  Urinalysis shows many white blood cells and moderate glucose.  Urine culture was negative for infection.    9.  Blood counts and chemistries are otherwise normal.    10.  A holiday from metformin abdominal pain does not improve.    Electronically signed Kamlesh Redding M.D.9/9/2017 1:41 PM

## 2017-09-07 NOTE — PATIENT INSTRUCTIONS
1.  Continue usual medicines and supplements - as listed.    2.  Use dicyclomine - up to 3 capsules daily - for stomach cramps.    3.  Use MiraLAX every day - 1/2 capful - to keep bowels regular.    4.  Speak to nurse tomorrow afternoon - about test results.    5.  Return visit October 2 - nonfasting checkup.

## 2017-09-08 ENCOUNTER — TELEPHONE (OUTPATIENT)
Dept: INTERNAL MEDICINE | Facility: CLINIC | Age: 82
End: 2017-09-08

## 2017-09-08 NOTE — TELEPHONE ENCOUNTER
----- Message from Hossein Winters sent at 9/8/2017  1:42 PM EDT -----  Contact: Quita  Lab Results:  TGF told her to call today for her lab results.  Quita [phone]

## 2017-09-08 NOTE — TELEPHONE ENCOUNTER
Returned call to pt, aidan answered, pt not home. Called and LM on pt's cell - per TGF labs all fine, shows no signs of infection, just have spastic colon, use dicyclomine for stomach cramps and use miralax daily to keep bowels regular.

## 2017-09-09 LAB — BACTERIA SPEC AEROBE CULT: NORMAL

## 2017-09-11 ENCOUNTER — TELEPHONE (OUTPATIENT)
Dept: INTERNAL MEDICINE | Facility: CLINIC | Age: 82
End: 2017-09-11

## 2017-09-11 NOTE — TELEPHONE ENCOUNTER
----- Message from Hossein Winters sent at 9/11/2017  1:10 PM EDT -----  Contact: MAYA  MEDICATION CHANGE:  MAYA IS REQUESTING THAT HER METFORMIN BE CHANGED TO THE NAME BRAND, GLUCOPHAGE-XR.  MAYA [phone]

## 2017-09-29 RX ORDER — DIAZEPAM 5 MG/1
TABLET ORAL
Qty: 30 TABLET | Refills: 0 | Status: CANCELLED | OUTPATIENT
Start: 2017-09-29

## 2017-10-02 ENCOUNTER — OFFICE VISIT (OUTPATIENT)
Dept: INTERNAL MEDICINE | Facility: CLINIC | Age: 82
End: 2017-10-02

## 2017-10-02 VITALS
RESPIRATION RATE: 16 BRPM | SYSTOLIC BLOOD PRESSURE: 156 MMHG | OXYGEN SATURATION: 98 % | BODY MASS INDEX: 25.42 KG/M2 | HEART RATE: 68 BPM | TEMPERATURE: 97.6 F | DIASTOLIC BLOOD PRESSURE: 70 MMHG | WEIGHT: 139 LBS

## 2017-10-02 DIAGNOSIS — F32.9 REACTIVE DEPRESSION: ICD-10-CM

## 2017-10-02 DIAGNOSIS — J45.20 MILD INTERMITTENT ASTHMA WITHOUT COMPLICATION: ICD-10-CM

## 2017-10-02 DIAGNOSIS — F41.1 GENERALIZED ANXIETY DISORDER: ICD-10-CM

## 2017-10-02 DIAGNOSIS — I10 ESSENTIAL HYPERTENSION: Primary | ICD-10-CM

## 2017-10-02 DIAGNOSIS — K31.84 GASTROPARESIS: ICD-10-CM

## 2017-10-02 DIAGNOSIS — K59.01 SLOW TRANSIT CONSTIPATION: ICD-10-CM

## 2017-10-02 PROCEDURE — 99213 OFFICE O/P EST LOW 20 MIN: CPT | Performed by: INTERNAL MEDICINE

## 2017-10-02 NOTE — PROGRESS NOTES
Subjective   Kasey Bass is a 89 y.o. female.     History of Present Illness     The patient's had a 40-50 year history of severe anxiety and has been on diazepam for treatment of acute Friday and insomnia at night.  She has been repeatedly asked him to promote her temazepam to 4 mg at night and has been unable to do so.  She feels she can only sleep well with 5 mg.  She has become more limited with her advancing age and physical disability.  Her daughter lives with the patient and her  and maintains a Holiday.  The patient has felt more desponded this year because of her limitations.    The following portions of the patient's history were reviewed and updated as appropriate: allergies, current medications, past family history, past medical history, past social history, past surgical history and problem list.    Review of Systems   Constitutional: Negative for appetite change and fatigue.   Respiratory: Negative for cough and shortness of breath.    Cardiovascular: Negative for chest pain and palpitations.   Gastrointestinal: Positive for abdominal pain. Negative for abdominal distention and nausea.        Recurrent abdominal cramping and constipation.  Currently well compensated   Neurological: Negative for dizziness and light-headedness.   Psychiatric/Behavioral: Positive for dysphoric mood and sleep disturbance. The patient is nervous/anxious.        Objective   Blood pressure 156/70, pulse 68, temperature 97.6 °F (36.4 °C), temperature source Oral, resp. rate 16, weight 139 lb (63 kg), SpO2 98 %.    Physical Exam   Constitutional: She is oriented to person, place, and time. She appears well-developed and well-nourished. No distress.   HENT:   Mild to moderate hearing impairment   Cardiovascular: Normal rate and regular rhythm.    Systolic murmur at apex   Pulmonary/Chest: Effort normal and breath sounds normal. She has no wheezes.   Abdominal: Soft. Bowel sounds are normal. She exhibits no distension  and no mass. There is no tenderness.   Neurological: She is alert and oriented to person, place, and time. She exhibits normal muscle tone. Coordination normal.   Psychiatric: She has a normal mood and affect. Her behavior is normal. Judgment and thought content normal.   Nursing note and vitals reviewed.    Procedures  Assessment/Plan   Kasey was seen today for anxiety.    Diagnoses and all orders for this visit:    Essential hypertension    Mild intermittent asthma without complication    Gastroparesis    Slow transit constipation    Generalized anxiety disorder    Reactive depression    The patient's chronic anxiety is adequately compensated.  The patient appears to be mildly addicted to Valium and is resistant to lowering the dose.    The patient does have a more reactive depression now and we should consider Lexapro.  Lexapro may help her with anxiety is well and stabilize her sleep.  We will attempt to drop her Valium to 4 mg.    The patient's had moderate spastic bowel this year and presented earlier this year with severe constipation.  She is much improved with scheduling of fiber and MiraLAX to regulate bowel activity.    Patient Instructions   1.  Continue usual medicines and supplements - as listed.    2.  Take Valium one half tablet - 1 or 2 nights weekly - to minimize quantity.    3.  Stretching exercises every morning - for good posture.    4.  Eat a well-balanced diet - low in salt and low in sugar.    5.  Return in 3 months - fasting checkup.    6.  Consider Lexapro 5 mg for stabilizing anxiety and reactive depression.    Electronically signed Kamlesh Redding M.D.10/3/2017 7:00 AM

## 2017-10-02 NOTE — PATIENT INSTRUCTIONS
1.  Continue usual medicines and supplements - as listed.    2.  Take Valium one half tablet - 1 or 2 nights weekly - to minimize quantity.    3.  Stretching exercises every morning - for good posture.    4.  Eat a well-balanced diet - low in salt and low in sugar.    5.  Return in 3 months - fasting checkup.

## 2017-10-03 ENCOUNTER — TELEPHONE (OUTPATIENT)
Dept: INTERNAL MEDICINE | Facility: CLINIC | Age: 82
End: 2017-10-03

## 2017-10-03 RX ORDER — DIAZEPAM 5 MG/1
TABLET ORAL
Qty: 30 TABLET | Refills: 0 | OUTPATIENT
Start: 2017-10-03 | End: 2017-11-01 | Stop reason: SDUPTHER

## 2017-10-03 NOTE — TELEPHONE ENCOUNTER
Pt is concerned about lowering her valium dose as she's always taken 5mg qnight and sleeps well.  Per TGF - will call refill in for valium 5mg 1/2-1 qnight but should try to minimize it's use. Pt verb understanding.

## 2017-10-03 NOTE — TELEPHONE ENCOUNTER
----- Message from Hossein Winters sent at 10/2/2017  2:54 PM EDT -----  Contact: MAYA  MED RENEWAL:  VALIUM.  SHE DROPPED A COUPLE AND IS OUT.  [facility].  MAYA [phone]

## 2017-11-01 RX ORDER — DIAZEPAM 5 MG/1
TABLET ORAL
Qty: 30 TABLET | Refills: 0 | OUTPATIENT
Start: 2017-11-01 | End: 2017-11-27 | Stop reason: SDUPTHER

## 2017-11-01 NOTE — TELEPHONE ENCOUNTER
----- Message from Katelynn Carrion sent at 10/31/2017  1:39 PM EDT -----  Contact: MAYA PIÑA- JIMMY, SHE TAKES A WHOLE TAB, NOT 1/2.  [facility] [phone]

## 2017-11-28 RX ORDER — METFORMIN HYDROCHLORIDE 500 MG/1
TABLET, EXTENDED RELEASE ORAL
Qty: 30 TABLET | Refills: 5 | Status: SHIPPED | OUTPATIENT
Start: 2017-11-28 | End: 2018-05-21 | Stop reason: SDUPTHER

## 2017-11-28 RX ORDER — DIAZEPAM 2 MG/1
TABLET ORAL
Qty: 50 TABLET | Refills: 0 | OUTPATIENT
Start: 2017-11-28 | End: 2018-01-03 | Stop reason: SDUPTHER

## 2018-01-03 ENCOUNTER — TELEPHONE (OUTPATIENT)
Dept: INTERNAL MEDICINE | Facility: CLINIC | Age: 83
End: 2018-01-03

## 2018-01-03 RX ORDER — DIAZEPAM 2 MG/1
TABLET ORAL
Qty: 50 TABLET | Refills: 0 | OUTPATIENT
Start: 2018-01-03 | End: 2018-01-26 | Stop reason: SDUPTHER

## 2018-01-03 NOTE — TELEPHONE ENCOUNTER
Per TGF - pt to stay at current dose.  She has office visit for 1/8/18 and can discuss further then.

## 2018-01-03 NOTE — TELEPHONE ENCOUNTER
MEDICINE NEEDED: patient would like TGF to give her 5 mg diazapam - had this before - only thing that really works.  [facility]

## 2018-01-26 ENCOUNTER — TELEPHONE (OUTPATIENT)
Dept: INTERNAL MEDICINE | Facility: CLINIC | Age: 83
End: 2018-01-26

## 2018-01-26 RX ORDER — DIAZEPAM 2 MG/1
TABLET ORAL
Qty: 50 TABLET | Refills: 0 | OUTPATIENT
Start: 2018-01-26 | End: 2018-02-20 | Stop reason: SDUPTHER

## 2018-01-26 RX ORDER — DIAZEPAM 2 MG/1
TABLET ORAL
Qty: 50 TABLET | Refills: 0 | Status: CANCELLED | OUTPATIENT
Start: 2018-01-26

## 2018-01-26 NOTE — TELEPHONE ENCOUNTER
I called pt. Pt needs Ox- she cxl'd 1/8/18 ox and didn't RS. She was transferred to . RF ok per TGF.

## 2018-02-20 ENCOUNTER — TELEPHONE (OUTPATIENT)
Dept: INTERNAL MEDICINE | Facility: CLINIC | Age: 83
End: 2018-02-20

## 2018-02-20 RX ORDER — DIAZEPAM 2 MG/1
TABLET ORAL
Qty: 60 TABLET | Refills: 0 | OUTPATIENT
Start: 2018-02-20 | End: 2018-03-22 | Stop reason: SDUPTHER

## 2018-02-20 RX ORDER — ESCITALOPRAM OXALATE 10 MG/1
10 TABLET ORAL DAILY
Qty: 30 TABLET | Refills: 0 | Status: SHIPPED | OUTPATIENT
Start: 2018-02-20 | End: 2018-05-01 | Stop reason: SDUPTHER

## 2018-02-20 NOTE — TELEPHONE ENCOUNTER
I called pt. Been taking diazepam 2mg x 2 hs and has #3 pills left.   Per TGF, pt advised start lexapro 10 mg qd and come for ox Fri at 3:30 or next Tues 3:30. Pt chooses Tues 3:30. Per TGF, will renew diazepam 2 mg #60 NR.  Pt verb understanding and says will start lexapro and come for appt Tues.

## 2018-02-20 NOTE — TELEPHONE ENCOUNTER
Pt called lost her  and she is having a hard time- not sleeping well - she is low on her diazepam and could we call her a refill and we are going to also schedule her an appointment to come to the office

## 2018-02-27 ENCOUNTER — OFFICE VISIT (OUTPATIENT)
Dept: INTERNAL MEDICINE | Facility: CLINIC | Age: 83
End: 2018-02-27

## 2018-02-27 VITALS
OXYGEN SATURATION: 96 % | RESPIRATION RATE: 16 BRPM | WEIGHT: 131 LBS | DIASTOLIC BLOOD PRESSURE: 80 MMHG | BODY MASS INDEX: 23.96 KG/M2 | SYSTOLIC BLOOD PRESSURE: 154 MMHG | TEMPERATURE: 99.4 F | HEART RATE: 80 BPM

## 2018-02-27 DIAGNOSIS — R07.2 PRECORDIAL PAIN: Primary | ICD-10-CM

## 2018-02-27 DIAGNOSIS — F51.01 PRIMARY INSOMNIA: ICD-10-CM

## 2018-02-27 DIAGNOSIS — K29.30 CHRONIC SUPERFICIAL GASTRITIS WITHOUT BLEEDING: ICD-10-CM

## 2018-02-27 DIAGNOSIS — E11.9 TYPE 2 DIABETES MELLITUS WITHOUT COMPLICATION, WITHOUT LONG-TERM CURRENT USE OF INSULIN (HCC): ICD-10-CM

## 2018-02-27 DIAGNOSIS — J45.20 MILD INTERMITTENT ASTHMA WITHOUT COMPLICATION: ICD-10-CM

## 2018-02-27 DIAGNOSIS — I10 ESSENTIAL HYPERTENSION: ICD-10-CM

## 2018-02-27 DIAGNOSIS — R10.12 LEFT UPPER QUADRANT PAIN: ICD-10-CM

## 2018-02-27 DIAGNOSIS — F41.1 GENERALIZED ANXIETY DISORDER: ICD-10-CM

## 2018-02-27 PROBLEM — J20.8 VIRAL BRONCHITIS: Status: RESOLVED | Noted: 2018-02-27 | Resolved: 2018-02-27

## 2018-02-27 PROBLEM — R07.9 CHEST PAIN: Status: ACTIVE | Noted: 2018-02-27

## 2018-02-27 PROBLEM — J20.8 VIRAL BRONCHITIS: Status: ACTIVE | Noted: 2018-02-27

## 2018-02-27 PROCEDURE — 99214 OFFICE O/P EST MOD 30 MIN: CPT | Performed by: INTERNAL MEDICINE

## 2018-02-27 PROCEDURE — 93000 ELECTROCARDIOGRAM COMPLETE: CPT | Performed by: INTERNAL MEDICINE

## 2018-02-27 RX ORDER — TRAZODONE HYDROCHLORIDE 50 MG/1
25-50 TABLET ORAL NIGHTLY
Qty: 30 TABLET | Refills: 6 | Status: SHIPPED | OUTPATIENT
Start: 2018-02-27 | End: 2018-03-02 | Stop reason: SINTOL

## 2018-02-27 RX ORDER — RANITIDINE 300 MG/1
300 TABLET ORAL NIGHTLY
Qty: 90 TABLET | Refills: 1 | Status: SHIPPED | OUTPATIENT
Start: 2018-02-27 | End: 2018-05-01

## 2018-02-28 ENCOUNTER — TELEPHONE (OUTPATIENT)
Dept: INTERNAL MEDICINE | Facility: CLINIC | Age: 83
End: 2018-02-28

## 2018-02-28 DIAGNOSIS — N81.10 FEMALE CYSTOCELE: Primary | ICD-10-CM

## 2018-03-02 ENCOUNTER — TELEPHONE (OUTPATIENT)
Dept: INTERNAL MEDICINE | Facility: CLINIC | Age: 83
End: 2018-03-02

## 2018-03-02 NOTE — TELEPHONE ENCOUNTER
"I called pt.  She reports she's been on trazodone 50 mg and ranitidine 300 mg x 2 nights.   Isn't sleeping well on it due to intense dreams, but more problematic is that last night she was \"weaving and staggering all over when she got up to go to BR, as she usually does - states she was amazed (she) didn't fall\".  Denies \"dizziness\" though.  She is concerned re falling.  Per BOLA GARCÍA trazodone. Extra care when getting up at night.  Call next week to report status.  Pt verb understanding.  "

## 2018-03-22 RX ORDER — DIAZEPAM 2 MG/1
TABLET ORAL
Qty: 60 TABLET | Refills: 0 | OUTPATIENT
Start: 2018-03-22 | End: 2018-04-23 | Stop reason: SDUPTHER

## 2018-03-22 NOTE — TELEPHONE ENCOUNTER
Pt is calling a needs a refill of her diazepam she took the last one last night   [facility]

## 2018-04-24 RX ORDER — DIAZEPAM 2 MG/1
TABLET ORAL
Qty: 60 TABLET | Refills: 0 | OUTPATIENT
Start: 2018-04-24 | End: 2018-05-21 | Stop reason: SDUPTHER

## 2018-05-01 ENCOUNTER — OFFICE VISIT (OUTPATIENT)
Dept: INTERNAL MEDICINE | Facility: CLINIC | Age: 83
End: 2018-05-01

## 2018-05-01 VITALS
WEIGHT: 130 LBS | TEMPERATURE: 98.2 F | BODY MASS INDEX: 23.78 KG/M2 | HEART RATE: 88 BPM | SYSTOLIC BLOOD PRESSURE: 132 MMHG | DIASTOLIC BLOOD PRESSURE: 72 MMHG | RESPIRATION RATE: 16 BRPM | OXYGEN SATURATION: 96 %

## 2018-05-01 DIAGNOSIS — K29.30 CHRONIC SUPERFICIAL GASTRITIS WITHOUT BLEEDING: ICD-10-CM

## 2018-05-01 DIAGNOSIS — F41.1 GENERALIZED ANXIETY DISORDER: ICD-10-CM

## 2018-05-01 DIAGNOSIS — E78.2 MIXED HYPERLIPIDEMIA: ICD-10-CM

## 2018-05-01 DIAGNOSIS — F51.01 PRIMARY INSOMNIA: ICD-10-CM

## 2018-05-01 DIAGNOSIS — E11.9 TYPE 2 DIABETES MELLITUS WITHOUT COMPLICATION, WITHOUT LONG-TERM CURRENT USE OF INSULIN (HCC): ICD-10-CM

## 2018-05-01 DIAGNOSIS — I10 ESSENTIAL HYPERTENSION: Primary | ICD-10-CM

## 2018-05-01 DIAGNOSIS — J45.20 MILD INTERMITTENT ASTHMA WITHOUT COMPLICATION: ICD-10-CM

## 2018-05-01 DIAGNOSIS — Z00.00 PREVENTATIVE HEALTH CARE: ICD-10-CM

## 2018-05-01 PROBLEM — R07.9 CHEST PAIN: Status: RESOLVED | Noted: 2018-02-27 | Resolved: 2018-05-01

## 2018-05-01 PROBLEM — R10.9 ABDOMINAL PAIN: Status: RESOLVED | Noted: 2017-06-29 | Resolved: 2018-05-01

## 2018-05-01 LAB
ALBUMIN SERPL-MCNC: 4.4 G/DL (ref 3.2–4.8)
ALBUMIN/GLOB SERPL: 2 G/DL (ref 1.5–2.5)
ALP SERPL-CCNC: 75 U/L (ref 25–100)
ALT SERPL W P-5'-P-CCNC: 12 U/L (ref 7–40)
ANION GAP SERPL CALCULATED.3IONS-SCNC: 10 MMOL/L (ref 3–11)
ARTICHOKE IGE QN: 117 MG/DL (ref 0–130)
AST SERPL-CCNC: 22 U/L (ref 0–33)
BASOPHILS # BLD AUTO: 0.03 10*3/MM3 (ref 0–0.2)
BASOPHILS NFR BLD AUTO: 0.5 % (ref 0–1)
BILIRUB SERPL-MCNC: 0.7 MG/DL (ref 0.3–1.2)
BUN BLD-MCNC: 12 MG/DL (ref 9–23)
BUN/CREAT SERPL: 12 (ref 7–25)
CALCIUM SPEC-SCNC: 9.4 MG/DL (ref 8.7–10.4)
CHLORIDE SERPL-SCNC: 105 MMOL/L (ref 99–109)
CHOLEST SERPL-MCNC: 197 MG/DL (ref 0–200)
CO2 SERPL-SCNC: 27 MMOL/L (ref 20–31)
CREAT BLD-MCNC: 1 MG/DL (ref 0.6–1.3)
CRP SERPL-MCNC: 0.14 MG/DL (ref 0–1)
DEPRECATED RDW RBC AUTO: 43 FL (ref 37–54)
EOSINOPHIL # BLD AUTO: 0.04 10*3/MM3 (ref 0–0.3)
EOSINOPHIL NFR BLD AUTO: 0.6 % (ref 0–3)
ERYTHROCYTE [DISTWIDTH] IN BLOOD BY AUTOMATED COUNT: 13.7 % (ref 11.3–14.5)
GFR SERPL CREATININE-BSD FRML MDRD: 52 ML/MIN/1.73
GLOBULIN UR ELPH-MCNC: 2.2 GM/DL
GLUCOSE BLD-MCNC: 118 MG/DL (ref 70–100)
HBA1C MFR BLD: 6.4 % (ref 4.8–5.6)
HCT VFR BLD AUTO: 41.3 % (ref 34.5–44)
HDLC SERPL-MCNC: 66 MG/DL (ref 40–60)
HGB BLD-MCNC: 13.7 G/DL (ref 11.5–15.5)
IMM GRANULOCYTES # BLD: 0.01 10*3/MM3 (ref 0–0.03)
IMM GRANULOCYTES NFR BLD: 0.2 % (ref 0–0.6)
LYMPHOCYTES # BLD AUTO: 1.92 10*3/MM3 (ref 0.6–4.8)
LYMPHOCYTES NFR BLD AUTO: 30.8 % (ref 24–44)
MCH RBC QN AUTO: 28.5 PG (ref 27–31)
MCHC RBC AUTO-ENTMCNC: 33.2 G/DL (ref 32–36)
MCV RBC AUTO: 85.9 FL (ref 80–99)
MONOCYTES # BLD AUTO: 0.51 10*3/MM3 (ref 0–1)
MONOCYTES NFR BLD AUTO: 8.2 % (ref 0–12)
NEUTROPHILS # BLD AUTO: 3.72 10*3/MM3 (ref 1.5–8.3)
NEUTROPHILS NFR BLD AUTO: 59.7 % (ref 41–71)
PLATELET # BLD AUTO: 174 10*3/MM3 (ref 150–450)
PMV BLD AUTO: 9.8 FL (ref 6–12)
POTASSIUM BLD-SCNC: 4.6 MMOL/L (ref 3.5–5.5)
PROT SERPL-MCNC: 6.6 G/DL (ref 5.7–8.2)
RBC # BLD AUTO: 4.81 10*6/MM3 (ref 3.89–5.14)
SODIUM BLD-SCNC: 142 MMOL/L (ref 132–146)
TRIGL SERPL-MCNC: 102 MG/DL (ref 0–150)
WBC NRBC COR # BLD: 6.23 10*3/MM3 (ref 3.5–10.8)

## 2018-05-01 PROCEDURE — 80053 COMPREHEN METABOLIC PANEL: CPT | Performed by: INTERNAL MEDICINE

## 2018-05-01 PROCEDURE — 86140 C-REACTIVE PROTEIN: CPT | Performed by: INTERNAL MEDICINE

## 2018-05-01 PROCEDURE — 99213 OFFICE O/P EST LOW 20 MIN: CPT | Performed by: INTERNAL MEDICINE

## 2018-05-01 PROCEDURE — 85025 COMPLETE CBC W/AUTO DIFF WBC: CPT | Performed by: INTERNAL MEDICINE

## 2018-05-01 PROCEDURE — 36415 COLL VENOUS BLD VENIPUNCTURE: CPT | Performed by: INTERNAL MEDICINE

## 2018-05-01 PROCEDURE — 83036 HEMOGLOBIN GLYCOSYLATED A1C: CPT | Performed by: INTERNAL MEDICINE

## 2018-05-01 PROCEDURE — G0439 PPPS, SUBSEQ VISIT: HCPCS | Performed by: INTERNAL MEDICINE

## 2018-05-01 PROCEDURE — 80061 LIPID PANEL: CPT | Performed by: INTERNAL MEDICINE

## 2018-05-01 RX ORDER — ESCITALOPRAM OXALATE 5 MG/1
10 TABLET ORAL NIGHTLY
Qty: 90 TABLET | Refills: 3 | Status: SHIPPED | OUTPATIENT
Start: 2018-05-01 | End: 2018-10-12

## 2018-05-01 RX ORDER — FAMOTIDINE 40 MG/1
40 TABLET, FILM COATED ORAL NIGHTLY
Qty: 90 TABLET | Refills: 3 | Status: SHIPPED | OUTPATIENT
Start: 2018-05-01 | End: 2023-02-27

## 2018-05-01 NOTE — PROGRESS NOTES
Subjective   Kasey Bass is a 89 y.o. female.     History of Present Illness     The patient's had a lifelong history of chronic anxiety in his used Valium for many decades.  A year ago she was dropped from 5 mg daily to 4 mg daily.  She feels that she is more tense and is having worse nights of sleep.  She would like to go back up on 5 mg of Valium.  She's been under particular stress in last year because of her husbands terminal illness and eventual death.    The following portions of the patient's history were reviewed and updated as appropriate: allergies, current medications, past family history, past medical history, past social history, past surgical history and problem list.    Review of Systems   Constitutional: Negative for appetite change and fatigue.   Respiratory: Negative for cough and shortness of breath.    Cardiovascular: Negative for chest pain and palpitations.   Gastrointestinal: Negative for abdominal distention and nausea.   Neurological: Negative for dizziness and light-headedness.   Psychiatric/Behavioral: Positive for sleep disturbance. The patient is nervous/anxious.         Anxiety and sleep disturbance mildly active.  Would prefer Valium 5 mg.       Objective   Blood pressure 132/72, pulse 88, temperature 98.2 °F (36.8 °C), temperature source Oral, resp. rate 16, weight 59 kg (130 lb), SpO2 96 %.    Physical Exam   Constitutional: She is oriented to person, place, and time. She appears well-developed and well-nourished. No distress.   Cardiovascular: Normal rate and regular rhythm.    Systolic murmur at apex   Pulmonary/Chest: Effort normal and breath sounds normal. She has no wheezes. She has no rales.   Neurological: She is alert and oriented to person, place, and time. She exhibits normal muscle tone. Coordination normal.   Psychiatric: She has a normal mood and affect. Her behavior is normal. Judgment and thought content normal.   Nursing note and vitals  reviewed.    Procedures  Assessment/Plan   Kasey was seen today for insomnia.    Diagnoses and all orders for this visit:    Essential hypertension    Mixed hyperlipidemia  -     Comprehensive Metabolic Panel  -     Lipid Panel    Mild intermittent asthma without complication  -     CBC & Differential  -     C-reactive Protein  -     CBC Auto Differential    Chronic superficial gastritis without bleeding    Type 2 diabetes mellitus without complication, without long-term current use of insulin  -     Hemoglobin A1c    Generalized anxiety disorder    Primary insomnia    Preventative health care    Other orders  -     famotidine (PEPCID) 40 MG tablet; Take 1 tablet by mouth Every Night.  -     escitalopram (LEXAPRO) 5 MG tablet; Take 2 tablets by mouth Every Night.      The patient's anxiety and sleep disturbance will likely respond best to low-dose Lexapro.  We may consider trazodone as an alternative.    The patient's type 2 diabetes is doing well on low-dose metformin.  She should continue a well-balanced diabetic diet.    The patient does complain of chronic indigestion and heartburn.  I've counseled her on using famotidine for long-term safety.  This may improve sleep quality.    The wellness exam has been reviewed in detail.  The patient has been fully counseled on preventative guidelines for vaccines, cancer screenings, and other health maintenance needs.  Functional testing has been performed to assess capacity for independent living and need for other medical interventions.   The patient was counseled on maintaining a lifestyle to promote good health and to minimize chronic diseases.  The patient has been assisted with scheduling healthcare procedures for the coming year and given a written document outlining these recommendations.    Patient Instructions   1.  Resume escitalopram every evening - for stress relaxation and better sleep.    2.  Start famotidine 40 mg at night - for stomach acid relief.    3.   Take Tylenol plus Bufferin at bedtime - to relieve achiness and sleep better.    4.  Use albuterol inhaler - 1 puff as needed - for shortness of breath or dry cough.    5.  Return visit 3 months - annual checkup.    6.  Hemoglobin A1c acceptable 6.4%.  Continue diabetic diet - Continue  metformin once daily.    7.  LDL cholesterol mildly elevated 117. - Consider atorvastatin Monday Friday only.    8.   Other laboratory tests are acceptable and require no change in treatment.    Electronically signed Kamlesh Redding M.D.5/3/2018 6:11 PM

## 2018-05-01 NOTE — PATIENT INSTRUCTIONS
1.  Resume escitalopram every evening - for stress relaxation and better sleep.    2.  Start famotidine 40 mg at night - for stomach acid relief.    3.  Take Tylenol plus Bufferin at bedtime - to relieve achiness and sleep better.    4.  Use albuterol inhaler - 1 puff as needed - for shortness of breath or dry cough.    5.  Return visit 3 months - annual checkup.

## 2018-05-03 NOTE — PROGRESS NOTES
QUICK REFERENCE INFORMATION:  The ABCs of the Annual Wellness Visit    Subsequent Medicare Wellness Visit    HEALTH RISK ASSESSMENT    5/28/1926    Recent Hospitalizations:  No hospitalization(s) within the last year..        Current Medical Providers:  Patient Care Team:  Kamlesh Redding MD as PCP - General (Internal Medicine)  Kamlesh Redding MD as PCP - Claims Attributed        Smoking Status:  History   Smoking Status   • Former Smoker   • Packs/day: 1.00   • Years: 22.00   • Types: Cigarettes   • Start date: 1944   • Quit date: 1966   Smokeless Tobacco   • Never Used       Alcohol Consumption:  History   Alcohol Use No       Depression Screen:   No flowsheet data found.    Health Habits and Functional and Cognitive Screening:  No flowsheet data found.        Does the patient have evidence of cognitive impairment? No    Aspirin use counseling: Taking ASA appropriately as indicated      Recent Lab Results:  CMP:  Lab Results   Component Value Date    BUN 12 05/01/2018    CREATININE 1.00 05/01/2018    EGFRIFNONA 52 (L) 05/01/2018    BCR 12.0 05/01/2018     05/01/2018    K 4.6 05/01/2018    CO2 27.0 05/01/2018    CALCIUM 9.4 05/01/2018    ALBUMIN 4.40 05/01/2018    LABIL2 2.0 05/01/2018    BILITOT 0.7 05/01/2018    ALKPHOS 75 05/01/2018    AST 22 05/01/2018    ALT 12 05/01/2018     Lipid Panel:  Lab Results   Component Value Date    CHOL 197 05/01/2018    TRIG 102 05/01/2018    HDL 66 (H) 05/01/2018    VLDL 20.6 06/24/2016    LDLHDL 1.91 06/24/2016     HbA1c:  Lab Results   Component Value Date    HGBA1C 6.40 (H) 05/01/2018       Visual Acuity:  No exam data present    Age-appropriate Screening Schedule:  Refer to the list below for future screening recommendations based on patient's age, sex and/or medical conditions. Orders for these recommended tests are listed in the plan section. The patient has been provided with a written plan.    Health Maintenance   Topic Date Due   • URINE MICROALBUMIN   05/28/1926   • TDAP/TD VACCINES (1 - Tdap) 07/01/2005   • ZOSTER VACCINE  05/16/2016   • MAMMOGRAM  06/24/2016   • DIABETIC FOOT EXAM  06/29/2018   • INFLUENZA VACCINE  08/01/2018   • HEMOGLOBIN A1C  11/01/2018   • DIABETIC EYE EXAM  12/08/2018   • LIPID PANEL  05/01/2019   • PNEUMOCOCCAL VACCINES (65+ LOW/MEDIUM RISK)  Excluded        Subjective   History of Present Illness    Kasey Bass is a 89 y.o. female who presents for an Subsequent Wellness Visit.    The following portions of the patient's history were reviewed and updated as appropriate: allergies, current medications, past family history, past medical history, past social history, past surgical history and problem list.    Outpatient Medications Prior to Visit   Medication Sig Dispense Refill   • albuterol (VENTOLIN HFA) 108 (90 BASE) MCG/ACT inhaler Inhale 1 puff As Needed for wheezing (for cough, wheezing or shortness of breath). 18 g 1   • aluminum-magnesium hydroxide-simethicone (MAALOX/MYLANTA) 200-200-20 MG/5ML suspension Take 15 mL by mouth Daily As Needed.     • aspirin 81 MG EC tablet Take  by mouth daily.     • Calcium Carbonate-Vitamin D (CALCIUM 500+D HIGH POTENCY) 500-400 MG-UNIT per tablet Take  by mouth As Needed.     • Cholecalciferol (VITAMIN D3) 5000 UNITS capsule capsule Take 1 capsule by mouth Daily. 30 capsule    • Coenzyme Q10 (CO Q-10) 200 MG capsule Take  by mouth 3 (Three) Times a Week.     • diazePAM (VALIUM) 2 MG tablet TAKE 1 TO 2 TABLETS BY MOUTH EVERY DAY AS NEEDED 60 tablet 0   • dicyclomine (BENTYL) 10 MG capsule Take 1 capsule by mouth 3 (Three) Times a Day As Needed (Cramps). 30 capsule 3   • diltiazem XR (DILACOR XR) 120 MG 24 hr capsule Take 1 capsule by mouth Daily. 90 capsule 3   • fluocinonide (LIDEX) 0.05 % ointment Apply  topically As Needed.     • fluticasone (FLONASE) 50 MCG/ACT nasal spray into each nostril Daily.     • GLUCOPHAGE  MG 24 hr tablet Take 1 tablet by mouth Daily With Breakfast. 90 tablet 1    • metFORMIN ER (GLUCOPHAGE-XR) 500 MG 24 hr tablet TAKE ONE TABLET BY MOUTH EVERY MORNING 30 tablet 5   • Multiple Vitamin (MULTI VITAMIN DAILY) tablet Take 1 tablet by mouth daily.     • nitroglycerin (NITROSTAT) 0.4 MG SL tablet Place 1 tablet under the tongue Every 5 (Five) Minutes As Needed for chest pain. Take no more than 3 doses in 15 minutes. 30 tablet 3   • Omega-3 Fatty Acids (FISH OIL BURP-LESS) 500 MG capsule Take  by mouth daily.     • polycarbophil (FIBERCON) 625 MG tablet Take 1 tablet by mouth As Needed.     • Polyethylene Glycol 3350 (PEG 3350) powder Take 8 g by mouth Every Morning. 4 ounces of juice     • sodium chloride (OCEAN) 0.65 % nasal spray into each nostril.     • White Petrolatum-Mineral Oil (SYSTANE NIGHTTIME) ointment Apply  to eye daily.     • escitalopram (LEXAPRO) 10 MG tablet Take 1 tablet by mouth Daily. 30 tablet 0   • raNITIdine (ZANTAC) 300 MG tablet Take 1 tablet by mouth Every Night. 90 tablet 1     No facility-administered medications prior to visit.        Patient Active Problem List   Diagnosis   • Atopic rhinitis   • Anxiety disorder   • Atherosclerosis of coronary artery   • Asthma   • Constipation   • Gastroparesis   • Gastritis   • Hearing impairment   • Hyperlipidemia   • Hypertension   • Low back pain   • Tinnitus   • Type 2 diabetes mellitus   • Vitamin B deficiency   • Vitamin D deficiency   • Insomnia   • Preventative health care   • Depression       Advance Care Planning:  has an advance directive - a copy HAS NOT been provided    Identification of Risk Factors:  Risk factors include: cardiovascular risk.    Review of Systems    Compared to one year ago, the patient feels her physical health is the same.  Compared to one year ago, the patient feels her mental health is the same.    Objective     Physical Exam    Vitals:    05/01/18 1328   BP: 132/72   BP Location: Right arm   Patient Position: Sitting   Pulse: 88  Comment: regular   Resp: 16  Comment: normal    Temp: 98.2 °F (36.8 °C)   TempSrc: Oral   SpO2: 96%  Comment: RA   Weight: 59 kg (130 lb)       Patient's Body mass index is 23.78 kg/m². BMI is within normal parameters. No follow-up required.      Assessment/Plan   Patient Self-Management and Personalized Health Advice  The patient has been provided with information about: diet, exercise, weight management, prevention of cardiac or vascular disease, fall prevention and mental health concerns and preventive services including:   · Exercise counseling provided, Fall Risk assessment done, Fall Risk plan of care done, Nutrition counseling provided, TdaP vaccine.    Visit Diagnoses:    ICD-10-CM ICD-9-CM   1. Essential hypertension I10 401.9   2. Mixed hyperlipidemia E78.2 272.2   3. Mild intermittent asthma without complication J45.20 493.90   4. Chronic superficial gastritis without bleeding K29.30 535.10   5. Type 2 diabetes mellitus without complication, without long-term current use of insulin E11.9 250.00   6. Generalized anxiety disorder F41.1 300.02   7. Primary insomnia F51.01 307.42   8. Preventative health care Z00.00 V70.0       Orders Placed This Encounter   Procedures   • Comprehensive Metabolic Panel   • C-reactive Protein   • Lipid Panel   • Hemoglobin A1c   • CBC Auto Differential   • CBC & Differential     Order Specific Question:   Manual Differential     Answer:   No       Outpatient Encounter Prescriptions as of 5/1/2018   Medication Sig Dispense Refill   • albuterol (VENTOLIN HFA) 108 (90 BASE) MCG/ACT inhaler Inhale 1 puff As Needed for wheezing (for cough, wheezing or shortness of breath). 18 g 1   • aluminum-magnesium hydroxide-simethicone (MAALOX/MYLANTA) 200-200-20 MG/5ML suspension Take 15 mL by mouth Daily As Needed.     • aspirin 81 MG EC tablet Take  by mouth daily.     • Calcium Carbonate-Vitamin D (CALCIUM 500+D HIGH POTENCY) 500-400 MG-UNIT per tablet Take  by mouth As Needed.     • Cholecalciferol (VITAMIN D3) 5000 UNITS capsule  capsule Take 1 capsule by mouth Daily. 30 capsule    • Coenzyme Q10 (CO Q-10) 200 MG capsule Take  by mouth 3 (Three) Times a Week.     • diazePAM (VALIUM) 2 MG tablet TAKE 1 TO 2 TABLETS BY MOUTH EVERY DAY AS NEEDED 60 tablet 0   • dicyclomine (BENTYL) 10 MG capsule Take 1 capsule by mouth 3 (Three) Times a Day As Needed (Cramps). 30 capsule 3   • diltiazem XR (DILACOR XR) 120 MG 24 hr capsule Take 1 capsule by mouth Daily. 90 capsule 3   • escitalopram (LEXAPRO) 5 MG tablet Take 2 tablets by mouth Every Night. 90 tablet 3   • famotidine (PEPCID) 40 MG tablet Take 1 tablet by mouth Every Night. 90 tablet 3   • fluocinonide (LIDEX) 0.05 % ointment Apply  topically As Needed.     • fluticasone (FLONASE) 50 MCG/ACT nasal spray into each nostril Daily.     • GLUCOPHAGE  MG 24 hr tablet Take 1 tablet by mouth Daily With Breakfast. 90 tablet 1   • metFORMIN ER (GLUCOPHAGE-XR) 500 MG 24 hr tablet TAKE ONE TABLET BY MOUTH EVERY MORNING 30 tablet 5   • Multiple Vitamin (MULTI VITAMIN DAILY) tablet Take 1 tablet by mouth daily.     • nitroglycerin (NITROSTAT) 0.4 MG SL tablet Place 1 tablet under the tongue Every 5 (Five) Minutes As Needed for chest pain. Take no more than 3 doses in 15 minutes. 30 tablet 3   • Omega-3 Fatty Acids (FISH OIL BURP-LESS) 500 MG capsule Take  by mouth daily.     • polycarbophil (FIBERCON) 625 MG tablet Take 1 tablet by mouth As Needed.     • Polyethylene Glycol 3350 (PEG 3350) powder Take 8 g by mouth Every Morning. 4 ounces of juice     • sodium chloride (OCEAN) 0.65 % nasal spray into each nostril.     • White Petrolatum-Mineral Oil (SYSTANE NIGHTTIME) ointment Apply  to eye daily.     • [DISCONTINUED] escitalopram (LEXAPRO) 10 MG tablet Take 1 tablet by mouth Daily. 30 tablet 0   • [DISCONTINUED] raNITIdine (ZANTAC) 300 MG tablet Take 1 tablet by mouth Every Night. 90 tablet 1     No facility-administered encounter medications on file as of 5/1/2018.        Reviewed use of high risk  medication in the elderly: yes  Reviewed for potential of harmful drug interactions in the elderly: yes    Follow Up:  Return in about 3 months (around 8/1/2018), or Dr Bethea, for Fasting, Recheck.     An After Visit Summary and PPPS with all of these plans were given to the patient.        The wellness exam has been reviewed in detail.  The patient has been fully counseled on preventative guidelines for vaccines, cancer screenings, and other health maintenance needs.  Functional testing has been performed to assess capacity for independent living and need for other medical interventions.   The patient was counseled on maintaining a lifestyle to promote good health and to minimize chronic diseases.  The patient has been assisted with scheduling healthcare procedures for the coming year and given a written document outlining these recommendations.    Electronically signed Kamlesh Redding M.D.5/3/2018 6:05 PM

## 2018-05-06 NOTE — PROGRESS NOTES
Subjective   Kasey Bass is a 89 y.o. female.     History of Present Illness         The following portions of the patient's history were reviewed and updated as appropriate: allergies, current medications, past family history, past medical history, past social history, past surgical history and problem list.    Review of Systems   Constitutional: Positive for fatigue. Negative for appetite change.   Respiratory: Negative for cough and shortness of breath.    Cardiovascular: Negative for chest pain and palpitations.   Gastrointestinal: Negative for abdominal distention and nausea.   Neurological: Negative for dizziness and light-headedness.   Psychiatric/Behavioral: Positive for dysphoric mood and sleep disturbance. The patient is nervous/anxious.        Objective   Blood pressure 132/72, pulse 88, temperature 98.2 °F (36.8 °C), temperature source Oral, resp. rate 16, weight 59 kg (130 lb), SpO2 96 %.    Physical Exam   Constitutional: She is oriented to person, place, and time. She appears well-developed and well-nourished.   Mild general distress   Cardiovascular: Normal rate, regular rhythm and normal heart sounds.    Pulmonary/Chest: Effort normal and breath sounds normal. She has no wheezes. She has no rales.   Neurological: She is alert and oriented to person, place, and time. She exhibits normal muscle tone. Coordination normal.   Psychiatric: Her behavior is normal. Judgment and thought content normal.   Mood and affect are somewhat low   Nursing note and vitals reviewed.    Procedures  Assessment/Plan   Kasey was seen today for insomnia.    Diagnoses and all orders for this visit:    Essential hypertension    Mixed hyperlipidemia  -     Comprehensive Metabolic Panel  -     Lipid Panel    Mild intermittent asthma without complication  -     CBC & Differential  -     C-reactive Protein  -     CBC Auto Differential    Chronic superficial gastritis without bleeding    Type 2 diabetes mellitus without  complication, without long-term current use of insulin  -     Hemoglobin A1c    Generalized anxiety disorder    Primary insomnia    Preventative health care    Other orders  -     famotidine (PEPCID) 40 MG tablet; Take 1 tablet by mouth Every Night.  -     escitalopram (LEXAPRO) 5 MG tablet; Take 2 tablets by mouth Every Night.        The wellness exam has been reviewed in detail.  The patient has been fully counseled on preventative guidelines for vaccines, cancer screenings, and other health maintenance needs.  Functional testing has been performed to assess capacity for independent living and need for other medical interventions.   The patient was counseled on maintaining a lifestyle to promote good health and to minimize chronic diseases.  The patient has been assisted with scheduling healthcare procedures for the coming year and given a written document outlining these recommendations.    Patient Instructions   1.  Resume escitalopram every evening - for stress relaxation and better sleep.    2.  Start famotidine 40 mg at night - for stomach acid relief.    3.  Take Tylenol plus Bufferin at bedtime - to relieve achiness and sleep better.    4.  Use albuterol inhaler - 1 puff as needed - for shortness of breath or dry cough.    5.  Return visit 3 months - annual checkup.    Electronically signed Kamlesh Redding M.D.5/6/2018 2:12 PM     This note was created in error and has been voided as erroneous.  Electronically signed Kamlesh Redding M.D.  This encounter was created in error - please disregard.

## 2018-05-08 ENCOUNTER — TELEPHONE (OUTPATIENT)
Dept: INTERNAL MEDICINE | Facility: CLINIC | Age: 83
End: 2018-05-08

## 2018-05-08 NOTE — TELEPHONE ENCOUNTER
Called labs to pt. Left VM per  ANNITA.  Per TGF:  Hemoglobin A1c acceptable 6.4%.  Continue diabetic diet and metformin once daily.  -LDL cholesterol mildly elevated 117.   -Other laboratory tests are acceptable and require no change in treatment.  Pt to call  With questions.

## 2018-05-21 RX ORDER — DIAZEPAM 2 MG/1
2-4 TABLET ORAL DAILY PRN
Qty: 60 TABLET | Refills: 0 | OUTPATIENT
Start: 2018-05-21 | End: 2018-06-18 | Stop reason: SDUPTHER

## 2018-05-21 RX ORDER — METFORMIN HYDROCHLORIDE 500 MG/1
TABLET, EXTENDED RELEASE ORAL
Qty: 30 TABLET | Refills: 2 | Status: SHIPPED | OUTPATIENT
Start: 2018-05-21 | End: 2018-08-21

## 2018-05-21 NOTE — TELEPHONE ENCOUNTER
PT IS CALLING AND NEEDS A  REFILL OF HER DIAZEPAM - SHE HAS (2) PILLS LEFT AND DOESN'T WANT TO RUN OUT  - PLEASE CALL THESE TO [facility] [

## 2018-06-18 RX ORDER — DIAZEPAM 2 MG/1
2-4 TABLET ORAL DAILY PRN
Qty: 60 TABLET | Refills: 0 | OUTPATIENT
Start: 2018-06-18 | End: 2018-07-24 | Stop reason: SDUPTHER

## 2018-07-24 RX ORDER — DIAZEPAM 2 MG/1
2-4 TABLET ORAL DAILY PRN
Qty: 60 TABLET | Refills: 0 | OUTPATIENT
Start: 2018-07-24 | End: 2018-08-21 | Stop reason: SDUPTHER

## 2018-07-24 RX ORDER — DIAZEPAM 2 MG/1
TABLET ORAL
Qty: 60 TABLET | Refills: 0 | Status: CANCELLED | OUTPATIENT
Start: 2018-07-24

## 2018-08-21 ENCOUNTER — APPOINTMENT (OUTPATIENT)
Dept: LAB | Facility: HOSPITAL | Age: 83
End: 2018-08-21

## 2018-08-21 ENCOUNTER — OFFICE VISIT (OUTPATIENT)
Dept: INTERNAL MEDICINE | Facility: CLINIC | Age: 83
End: 2018-08-21

## 2018-08-21 ENCOUNTER — HOSPITAL ENCOUNTER (OUTPATIENT)
Dept: GENERAL RADIOLOGY | Facility: HOSPITAL | Age: 83
Discharge: HOME | End: 2018-08-21
Admitting: FAMILY MEDICINE

## 2018-08-21 VITALS
HEART RATE: 86 BPM | TEMPERATURE: 97.8 F | OXYGEN SATURATION: 96 % | DIASTOLIC BLOOD PRESSURE: 68 MMHG | BODY MASS INDEX: 23.74 KG/M2 | WEIGHT: 129.8 LBS | SYSTOLIC BLOOD PRESSURE: 162 MMHG

## 2018-08-21 DIAGNOSIS — M25.512 ACUTE PAIN OF LEFT SHOULDER: Primary | ICD-10-CM

## 2018-08-21 DIAGNOSIS — H90.0 CONDUCTIVE HEARING LOSS, BILATERAL: ICD-10-CM

## 2018-08-21 DIAGNOSIS — M25.512 ACUTE PAIN OF LEFT SHOULDER: ICD-10-CM

## 2018-08-21 DIAGNOSIS — E11.9 TYPE 2 DIABETES MELLITUS WITHOUT COMPLICATION, WITHOUT LONG-TERM CURRENT USE OF INSULIN (HCC): ICD-10-CM

## 2018-08-21 DIAGNOSIS — F51.01 PRIMARY INSOMNIA: ICD-10-CM

## 2018-08-21 DIAGNOSIS — F41.1 GENERALIZED ANXIETY DISORDER: ICD-10-CM

## 2018-08-21 DIAGNOSIS — R07.81 RIB PAIN ON LEFT SIDE: ICD-10-CM

## 2018-08-21 DIAGNOSIS — R10.30 LOWER ABDOMINAL PAIN: ICD-10-CM

## 2018-08-21 PROCEDURE — 99214 OFFICE O/P EST MOD 30 MIN: CPT | Performed by: FAMILY MEDICINE

## 2018-08-21 PROCEDURE — 73030 X-RAY EXAM OF SHOULDER: CPT

## 2018-08-21 PROCEDURE — 71101 X-RAY EXAM UNILAT RIBS/CHEST: CPT

## 2018-08-21 RX ORDER — ALBUTEROL SULFATE 90 UG/1
1 AEROSOL, METERED RESPIRATORY (INHALATION) AS NEEDED
Qty: 18 G | Refills: 1 | Status: SHIPPED | OUTPATIENT
Start: 2018-08-21 | End: 2020-11-16 | Stop reason: SDUPTHER

## 2018-08-21 RX ORDER — RANITIDINE 300 MG/1
TABLET ORAL
Refills: 1 | COMMUNITY
Start: 2018-05-30 | End: 2018-10-15 | Stop reason: SDUPTHER

## 2018-08-21 RX ORDER — DIAZEPAM 5 MG/1
5 TABLET ORAL NIGHTLY PRN
Qty: 30 TABLET | Refills: 1 | Status: SHIPPED | OUTPATIENT
Start: 2018-08-21 | End: 2018-10-12 | Stop reason: SDUPTHER

## 2018-08-23 ENCOUNTER — TELEPHONE (OUTPATIENT)
Dept: INTERNAL MEDICINE | Facility: CLINIC | Age: 83
End: 2018-08-23

## 2018-09-11 ENCOUNTER — TELEPHONE (OUTPATIENT)
Dept: FAMILY MEDICINE CLINIC | Facility: CLINIC | Age: 83
End: 2018-09-11

## 2018-09-11 NOTE — TELEPHONE ENCOUNTER
Ask her what the physical therapy is directed at/diagnosis that I can put with the order.  I will be glad to put this order in.

## 2018-09-11 NOTE — TELEPHONE ENCOUNTER
PER PT'S DAUGHTER CALLED STATED PT HAD 1 VISIT FOR PHYSICAL THERAPY. PT WOULD LIKE TO HAVE SOME MORE PHYSICAL THERAPY VISITS AND IS REQUESTING IF THESE CAN BE ORDERED. PLEASE CALL BACK TO ADVISE.

## 2018-09-11 NOTE — TELEPHONE ENCOUNTER
shoulder. Pt's daughter states that you sent her mother to physical therapy. Pt's daughter also states that her mothers name is Quita not Kasey and that she never goes by Kasey. Pt's daughter advise that you read over her mother's previous notes from yourself and then advise.    She was very upset.

## 2018-09-12 NOTE — TELEPHONE ENCOUNTER
I know I sent her to physical therapy for the shoulder, that's why was wondering why she would need another order. PT was ordered for that reason, and she should not need an additional order for followup visits for the same issue. These can just be worked out with the PT office. I apologize for the confusion, but if it is for the same issue she does not need another order. The only reason she would need a new order would be if it were for a new issue. That is why I asked.

## 2018-10-12 ENCOUNTER — OFFICE VISIT (OUTPATIENT)
Dept: FAMILY MEDICINE CLINIC | Facility: CLINIC | Age: 83
End: 2018-10-12

## 2018-10-12 VITALS
SYSTOLIC BLOOD PRESSURE: 160 MMHG | OXYGEN SATURATION: 96 % | BODY MASS INDEX: 24.14 KG/M2 | WEIGHT: 132 LBS | DIASTOLIC BLOOD PRESSURE: 80 MMHG | HEART RATE: 72 BPM

## 2018-10-12 DIAGNOSIS — K58.1 IRRITABLE BOWEL SYNDROME WITH CONSTIPATION: Primary | ICD-10-CM

## 2018-10-12 DIAGNOSIS — L30.4 INTERTRIGO: ICD-10-CM

## 2018-10-12 DIAGNOSIS — F51.01 PRIMARY INSOMNIA: ICD-10-CM

## 2018-10-12 DIAGNOSIS — F41.1 GENERALIZED ANXIETY DISORDER: ICD-10-CM

## 2018-10-12 PROCEDURE — 99213 OFFICE O/P EST LOW 20 MIN: CPT | Performed by: FAMILY MEDICINE

## 2018-10-12 RX ORDER — NYSTATIN 100000 [USP'U]/G
POWDER TOPICAL 4 TIMES DAILY
Qty: 60 G | Refills: 0 | Status: SHIPPED | OUTPATIENT
Start: 2018-10-12 | End: 2018-10-15 | Stop reason: SDUPTHER

## 2018-10-12 RX ORDER — DIAZEPAM 5 MG/1
5 TABLET ORAL NIGHTLY PRN
Qty: 30 TABLET | Refills: 1 | Status: SHIPPED | OUTPATIENT
Start: 2018-10-12 | End: 2018-12-26 | Stop reason: SDUPTHER

## 2018-10-12 NOTE — PATIENT INSTRUCTIONS
1.  Try Linzess every morning before breakfast.    2.  You may also use docusate over the counter.

## 2018-10-12 NOTE — TELEPHONE ENCOUNTER
Med refills for:  Diltiazem xr, Nnystatin, ranitidine 300 mg4x daily,sodium chloride .065% spray,  [facility] in [place]

## 2018-10-12 NOTE — PROGRESS NOTES
Subjective   Kasey Bass is a 89 y.o. female.     Chief Complaint   Patient presents with   • Follow-up       History of Present Illness     Kasey Bass presents today for follow up on multiple issues. Stomach pain over the past weeks. She takes Dulcolax every now and then. She took 2 a couple nights ago and had a lot of stomach upset.  She has abdominal pain which improves with defecation.  She has been diagnosed with IBS with constipation previously.    The following portions of the patient's history were reviewed and updated as appropriate: allergies, current medications, past family history, past medical history, past social history, past surgical history and problem list.    Active Ambulatory Problems     Diagnosis Date Noted   • Atopic rhinitis 06/16/2016   • Anxiety disorder 06/16/2016   • Atherosclerosis of coronary artery 06/16/2016   • Asthma 06/16/2016   • Constipation 06/16/2016   • Gastroparesis 06/16/2016   • Gastritis 06/16/2016   • Hearing impairment 06/16/2016   • Hyperlipidemia 06/16/2016   • Hypertension 06/16/2016   • Low back pain 06/16/2016   • Tinnitus 06/16/2016   • Type 2 diabetes mellitus (CMS/HCC) 06/16/2016   • Vitamin B deficiency 06/16/2016   • Vitamin D deficiency 06/16/2016   • Insomnia 06/24/2016   • Preventative health care 09/30/2016   • Depression 06/29/2017     Resolved Ambulatory Problems     Diagnosis Date Noted   • Acute bronchitis 06/16/2016   • Acute upper respiratory infection 06/16/2016   • Chest pain 06/16/2016   • Pain in joint 06/16/2016   • Candidiasis of skin 06/16/2016   • Hypoxia 06/24/2016   • DM II (diabetes mellitus, type II), controlled (CMS/[facility])    • Chest pain    • Abdominal pain 06/29/2017   • Viral bronchitis 02/27/2018   • Chest pain 02/27/2018     Past Medical History:   Diagnosis Date   • Allergic rhinitis Lifelong   • Anaphylactic reaction Remote   • Anxiety disorder 1985   • Asthma 1960   • CAD (coronary artery disease) 2004   • Chronic constipation     • DM II (diabetes mellitus, type II), controlled (CMS/HCC)    • Fracture of lumbar spine (CMS/[facility])    • Gastroparesis Adulthood   • GERD (gastroesophageal reflux disease)    • Gluten-sensitive enteropathy    • Hearing impairment    • HLD (hyperlipidemia) Adulthood   • Hypertension    • Insomnia    • Labyrinthitis    • Lumbar scoliosis    • Scoliosis Adulthood   • Vitamin D deficiency      Past Surgical History:   Procedure Laterality Date   • APPENDECTOMY     • CATARACT EXTRACTION Bilateral    • CHOLECYSTECTOMY     • CORONARY ANGIOPLASTY  2004    Small branch disease only     Family History   Problem Relation Age of Onset   • Diabetes Mother    • Heart disease Mother    • Stomach cancer Father    • Kidney cancer Sister    • Heart disease Son          age 50   • Cancer Maternal Grandmother    • Cancer Paternal Grandfather      Social History     Social History   • Marital status:      Spouse name: N/A   • Number of children: N/A   • Years of education: N/A     Occupational History   • Not on file.     Social History Main Topics   • Smoking status: Former Smoker     Packs/day: 0.25     Years: 22.00     Types: Cigarettes     Start date:      Quit date:    • Smokeless tobacco: Never Used   • Alcohol use No   • Drug use: No   • Sexual activity: Not on file     Other Topics Concern   • Not on file     Social History Narrative    Domestic life   lives in private home with grown daughter.    2017.          [employer]   [employer]olic        Sleep hygiene  in bed 10 PM to 8 AM for 6 hours nightly often broken        Caffeine use   1 cup of coffee daily        Exercise habits  walks daily as tolerated.  Stretches every morning.        Diet   low-salt diabetic diet.        Occupation   homemaker        Hearing  moderate impairment - improves with hearing aids.          Vision  corrects with bifocal glasses.        Dental : Upper dentures         Driving   local traffic daytime in good weather             Review of Systems   Constitutional: Negative for fatigue and fever.   Gastrointestinal: Positive for abdominal distention, abdominal pain and constipation. Negative for diarrhea, nausea and vomiting.       Objective   Blood pressure 160/80, pulse 72, weight 59.9 kg (132 lb), SpO2 96 %, not currently breastfeeding.  Nursing note reviewed  Physical Exam  Const: NAD, A&Ox4, Pleasant, Cooperative  Eyes: EOMI, no conjunctivitis  ENT: No nasal discharge present, neck supple  Cardiac: Regular rate and rhythm, no peripheral edema or cyanosis  Resp: Respiratory rate within normal limits, no increased work of breathing, no audible wheezing or retractions noted  GI: No distention or ascites.  She is nontender to palpation.  Bowel sounds are present and active in all 4 quadrants  MSK: Motor and sensation grossly intact in bilateral upper extremities  Neurologic, CN II-XII grossly intact  Psych: Appropriate mood and behavior.  Skin: Pink, warm, dry  Procedures  Assessment/Plan   Kasey was seen today for follow-up.    Diagnoses and all orders for this visit:    Irritable bowel syndrome with constipation  -     linaclotide (LINZESS) 72 MCG capsule capsule; Take 1 capsule by mouth Every Morning Before Breakfast.    Intertrigo  -     Discontinue: nystatin (MYCOSTATIN) 193863 UNIT/GM powder; Apply  topically to the appropriate area as directed 4 (Four) Times a Day.        Patient Instructions   1.  Try Linzess every morning before breakfast.    2.  You may also use docusate over the counter.      Ambulatory progress note signed and attested to by Andrew Bethea D.O. on 10/29/2018 at 12:29.

## 2018-10-12 NOTE — TELEPHONE ENCOUNTER
Last fill: 8/21/18  Last office visit: today  Next office visit: 10/23/18  Last Gonzalez: ran today   Controlled substance contract on file? Needs updated   Last UDS: needs updated

## 2018-10-15 RX ORDER — RANITIDINE 300 MG/1
300 TABLET ORAL NIGHTLY
Qty: 30 TABLET | Refills: 1 | Status: SHIPPED | OUTPATIENT
Start: 2018-10-15 | End: 2020-11-16

## 2018-10-15 RX ORDER — NYSTATIN 100000 [USP'U]/G
POWDER TOPICAL 4 TIMES DAILY
Qty: 60 G | Refills: 0 | Status: SHIPPED | OUTPATIENT
Start: 2018-10-15 | End: 2023-02-27

## 2018-10-18 RX ORDER — DILTIAZEM HYDROCHLORIDE 120 MG/1
120 CAPSULE, EXTENDED RELEASE ORAL DAILY
Qty: 90 CAPSULE | Refills: 3 | Status: SHIPPED | OUTPATIENT
Start: 2018-10-18 | End: 2019-01-23

## 2018-10-18 RX ORDER — ECHINACEA PURPUREA EXTRACT 125 MG
2 TABLET ORAL AS NEEDED
Qty: 30 ML | Refills: 5 | Status: SHIPPED | OUTPATIENT
Start: 2018-10-18 | End: 2019-01-23

## 2018-10-29 ENCOUNTER — LAB (OUTPATIENT)
Dept: LAB | Facility: HOSPITAL | Age: 83
End: 2018-10-29

## 2018-10-29 ENCOUNTER — OFFICE VISIT (OUTPATIENT)
Dept: FAMILY MEDICINE CLINIC | Facility: CLINIC | Age: 83
End: 2018-10-29

## 2018-10-29 VITALS
OXYGEN SATURATION: 98 % | DIASTOLIC BLOOD PRESSURE: 80 MMHG | SYSTOLIC BLOOD PRESSURE: 148 MMHG | WEIGHT: 132 LBS | HEIGHT: 62 IN | HEART RATE: 90 BPM | BODY MASS INDEX: 24.29 KG/M2

## 2018-10-29 DIAGNOSIS — R10.9 LEFT FLANK PAIN: ICD-10-CM

## 2018-10-29 DIAGNOSIS — R35.0 URINARY FREQUENCY: Primary | ICD-10-CM

## 2018-10-29 DIAGNOSIS — R35.0 URINARY FREQUENCY: ICD-10-CM

## 2018-10-29 DIAGNOSIS — M99.08 SOMATIC DYSFUNCTION OF RIB CAGE REGION: ICD-10-CM

## 2018-10-29 DIAGNOSIS — F32.0 CURRENT MILD EPISODE OF MAJOR DEPRESSIVE DISORDER, UNSPECIFIED WHETHER RECURRENT (HCC): ICD-10-CM

## 2018-10-29 DIAGNOSIS — G47.9 DYSSOMNIA: ICD-10-CM

## 2018-10-29 LAB
BACTERIA UR QL AUTO: ABNORMAL /HPF
BILIRUB UR QL STRIP: NEGATIVE
CLARITY UR: CLEAR
COLOR UR: YELLOW
GLUCOSE UR STRIP-MCNC: NEGATIVE MG/DL
HGB UR QL STRIP.AUTO: NEGATIVE
HYALINE CASTS UR QL AUTO: ABNORMAL /LPF
KETONES UR QL STRIP: ABNORMAL
LEUKOCYTE ESTERASE UR QL STRIP.AUTO: ABNORMAL
NITRITE UR QL STRIP: NEGATIVE
PH UR STRIP.AUTO: <=5 [PH] (ref 5–8)
PROT UR QL STRIP: NEGATIVE
RBC # UR: ABNORMAL /HPF
REF LAB TEST METHOD: ABNORMAL
SP GR UR STRIP: 1.01 (ref 1–1.03)
SQUAMOUS #/AREA URNS HPF: ABNORMAL /HPF
UROBILINOGEN UR QL STRIP: ABNORMAL
WBC UR QL AUTO: ABNORMAL /HPF

## 2018-10-29 PROCEDURE — 81001 URINALYSIS AUTO W/SCOPE: CPT | Performed by: FAMILY MEDICINE

## 2018-10-29 PROCEDURE — 87086 URINE CULTURE/COLONY COUNT: CPT | Performed by: FAMILY MEDICINE

## 2018-10-29 PROCEDURE — 99214 OFFICE O/P EST MOD 30 MIN: CPT | Performed by: FAMILY MEDICINE

## 2018-10-29 RX ORDER — SULFAMETHOXAZOLE AND TRIMETHOPRIM 800; 160 MG/1; MG/1
1 TABLET ORAL 2 TIMES DAILY
Qty: 6 TABLET | Refills: 0 | Status: SHIPPED | OUTPATIENT
Start: 2018-10-29 | End: 2018-11-01

## 2018-10-29 RX ORDER — AMITRIPTYLINE HYDROCHLORIDE 25 MG/1
25 TABLET, FILM COATED ORAL NIGHTLY
Qty: 30 TABLET | Refills: 1 | Status: SHIPPED | OUTPATIENT
Start: 2018-10-29 | End: 2019-01-23

## 2018-10-29 NOTE — PROGRESS NOTES
Subjective   Kasey Bass is a 89 y.o. female.     Chief Complaint   Patient presents with   • Follow-up       History of Present Illness     Kasey Bass presents today for follow-up for her irritable bowel syndrome.  She was seen on 10/12/18 in this office, and was given samples of Linzess. She reports this did fairly well for her. She is getting up to use the restroom 2-5 times per night. Left flank pain radiating to left breast.  She reports that this is chronic.  She does have a history of shingles in the area, and has never been on medication for postherpetic neuralgia.  Her pain is about a 4/10.  Also reports significant depression.  Her   6 months ago, and she has been having a tough time since then.    The following portions of the patient's history were reviewed and updated as appropriate: allergies, current medications, past family history, past medical history, past social history, past surgical history and problem list.    Active Ambulatory Problems     Diagnosis Date Noted   • Atopic rhinitis 2016   • Anxiety disorder 2016   • Atherosclerosis of coronary artery 2016   • Asthma 2016   • Constipation 2016   • Gastroparesis 2016   • Gastritis 2016   • Hearing impairment 2016   • Hyperlipidemia 2016   • Hypertension 2016   • Low back pain 2016   • Tinnitus 2016   • Type 2 diabetes mellitus (CMS/HCC) 2016   • Vitamin B deficiency 2016   • Vitamin D deficiency 2016   • Insomnia 2016   • Preventative health care 2016   • Depression 2017     Resolved Ambulatory Problems     Diagnosis Date Noted   • Acute bronchitis 2016   • Acute upper respiratory infection 2016   • Chest pain 2016   • Pain in joint 2016   • Candidiasis of skin 2016   • Hypoxia 2016   • DM II (diabetes mellitus, type II), controlled (CMS/[facility])    • Chest pain    • Abdominal pain 2017    • Viral bronchitis 2018   • Chest pain 2018     Past Medical History:   Diagnosis Date   • Allergic rhinitis Lifelong   • Anaphylactic reaction Remote   • Anxiety disorder    • Asthma    • CAD (coronary artery disease)    • Chronic constipation    • DM II (diabetes mellitus, type II), controlled (CMS/[facility])    • Fracture of lumbar spine (CMS/[facility])    • Gastroparesis Adulthood   • GERD (gastroesophageal reflux disease)    • Gluten-sensitive enteropathy    • Hearing impairment    • HLD (hyperlipidemia) Adulthood   • Hypertension    • Insomnia    • Labyrinthitis    • Lumbar scoliosis    • Scoliosis Adulthood   • Vitamin D deficiency      Past Surgical History:   Procedure Laterality Date   • APPENDECTOMY     • CATARACT EXTRACTION Bilateral    • CHOLECYSTECTOMY     • CORONARY ANGIOPLASTY      Small branch disease only     Family History   Problem Relation Age of Onset   • Diabetes Mother    • Heart disease Mother    • Stomach cancer Father    • Kidney cancer Sister    • Heart disease Son          age 50   • Cancer Maternal Grandmother    • Cancer Paternal Grandfather      Social History     Social History   • Marital status:      Spouse name: N/A   • Number of children: N/A   • Years of education: N/A     Occupational History   • Not on file.     Social History Main Topics   • Smoking status: Former Smoker     Packs/day: 0.25     Years: 22.00     Types: Cigarettes     Start date:      Quit date:    • Smokeless tobacco: Never Used   • Alcohol use No   • Drug use: No   • Sexual activity: Not on file     Other Topics Concern   • Not on file     Social History Narrative    Domestic life   lives in private home with grown daughter.    2017.          [employer]   Buddhist        Sleep hygiene  in bed 10 PM to 8 AM for 6 hours nightly often broken        Caffeine use   1 cup of coffee daily        Exercise habits   "walks daily as tolerated.  Stretches every morning.        Diet   low-salt diabetic diet.        Occupation   homemaker        Hearing  moderate impairment - improves with hearing aids.          Vision  corrects with bifocal glasses.        Dental : Upper dentures        Driving   local traffic daytime in good weather             Review of Systems   Constitutional: Negative for activity change.   Gastrointestinal: Negative for abdominal distention, anal bleeding and blood in stool.        Chronic IBS-C   Genitourinary: Positive for enuresis, flank pain and frequency.   Psychiatric/Behavioral: Positive for dysphoric mood and sleep disturbance. Negative for self-injury.       Objective   Blood pressure 148/80, pulse 90, height 157.5 cm (62\"), weight 59.9 kg (132 lb), SpO2 98 %, not currently breastfeeding.  Nursing note reviewed  Physical Exam  Const: NAD, A&Ox4,   Eyes: EOMI, no conjunctivitis  ENT: No nasal discharge present, neck supple- very hard of hearing  Cardiac: Regular rate and rhythm, no cyanosis  Resp: Respiratory rate within normal limits, no increased work of breathing, no audible wheezing or retractions noted  GI: No distention or ascites  MSK: Motor and sensation grossly intact in bilateral upper extremities  Neurologic: CN II-XII grossly intact  Psych: Mood is described as depressed, affect is appropriate and congruent  Skin: Pink, warm, dry  PHQ-9 Depression Screening  Little interest or pleasure in doing things? 2   Feeling down, depressed, or hopeless? 0   Trouble falling or staying asleep, or sleeping too much? 3   Feeling tired or having little energy? 3   Poor appetite or overeating? 2   Feeling bad about yourself - or that you are a failure or have let yourself or your family down? 1   Trouble concentrating on things, such as reading the newspaper or watching television? 0   Moving or speaking so slowly that other people could have noticed? Or the opposite - being so fidgety or restless that " you have been moving around a lot more than usual? 0   Thoughts that you would be better off dead, or of hurting yourself in some way? 0   PHQ-9 Total Score 11   If you checked off any problems, how difficult have these problems made it for you to do your work, take care of things at home, or get along with other people? Not difficult at all     Procedures  Assessment/Plan   Kasey was seen today for follow-up.    Diagnoses and all orders for this visit:    Urinary frequency  -     Urinalysis With Culture If Indicated - Urine, Clean Catch; Future  -     sulfamethoxazole-trimethoprim (BACTRIM DS) 800-160 MG per tablet; Take 1 tablet by mouth 2 (Two) Times a Day for 3 days.    Dyssomnia  -     amitriptyline (ELAVIL) 25 MG tablet; Take 1 tablet by mouth Every Night.    Current mild episode of major depressive disorder, unspecified whether recurrent (CMS/HCC)  -     amitriptyline (ELAVIL) 25 MG tablet; Take 1 tablet by mouth Every Night.    Left flank pain  -     Urinalysis With Culture If Indicated - Urine, Clean Catch; Future  -     sulfamethoxazole-trimethoprim (BACTRIM DS) 800-160 MG per tablet; Take 1 tablet by mouth 2 (Two) Times a Day for 3 days.    Somatic dysfunction of rib cage region  -     Ambulatory Referral to Physical Therapy Evaluate and treat        Acute concerns:  #1 depression  Mild depression with PHQ2 9 of 11.  Seems mostly reactive in relation to the death of her  6 months ago and general loneliness.  We discussed increasing social participation and family ties.  I do think she would do well with a light antidepressant such as amitriptyline at night.  This may also help with the element of possible postherpetic neuralgia of the left flank as well as sleep difficulty    #2 left thoracic/flank pain  She does have a slight somatic dysfunction of the ribs.  She is interested in physical therapy for this, referral has been made today.  We will check a urinalysis today given her urinary frequency  at night.  Other possible etiologies include a postherpetic neuralgia given that she has had shingles on numerous occasions in the past.    #3 UTI  UA microscopic positive for RBC, WBC- good number of squam cells as well, so may be contaminant but RBCs concerning along with symptoms of increased frequency.  - Rx for bactrim called in to pharmacy. Allergic to cipro    Patient Instructions   1. Start new medication at night for sleep and depression.    2. Urine sample today.    3. Physical therapy ordered today for rib dysfunction.      Return in about 2 weeks (around 11/12/2018) for depression (with PHQ9).    Ambulatory progress note signed and attested to by Andrew Bethea D.O. on 10/29/2018 at 19:56.

## 2018-10-29 NOTE — PATIENT INSTRUCTIONS
1. Start new medication at night for sleep and depression.    2. Urine sample today.    3. Physical therapy ordered today for rib dysfunction.

## 2018-10-31 LAB — BACTERIA SPEC AEROBE CULT: NORMAL

## 2018-11-14 ENCOUNTER — OFFICE VISIT (OUTPATIENT)
Dept: FAMILY MEDICINE CLINIC | Facility: CLINIC | Age: 83
End: 2018-11-14

## 2018-11-14 ENCOUNTER — LAB (OUTPATIENT)
Dept: LAB | Facility: HOSPITAL | Age: 83
End: 2018-11-14

## 2018-11-14 VITALS
OXYGEN SATURATION: 98 % | HEIGHT: 62 IN | DIASTOLIC BLOOD PRESSURE: 82 MMHG | BODY MASS INDEX: 24.66 KG/M2 | SYSTOLIC BLOOD PRESSURE: 138 MMHG | WEIGHT: 134 LBS | HEART RATE: 82 BPM

## 2018-11-14 DIAGNOSIS — F32.0 CURRENT MILD EPISODE OF MAJOR DEPRESSIVE DISORDER, UNSPECIFIED WHETHER RECURRENT (HCC): ICD-10-CM

## 2018-11-14 DIAGNOSIS — N32.81 OVERACTIVE BLADDER: Primary | ICD-10-CM

## 2018-11-14 DIAGNOSIS — K58.1 IRRITABLE BOWEL SYNDROME WITH CONSTIPATION: ICD-10-CM

## 2018-11-14 DIAGNOSIS — R35.0 FREQUENCY OF URINATION: ICD-10-CM

## 2018-11-14 DIAGNOSIS — G47.9 DYSSOMNIA: ICD-10-CM

## 2018-11-14 LAB
BILIRUB BLD-MCNC: NEGATIVE MG/DL
CLARITY, POC: CLEAR
COLOR UR: YELLOW
GLUCOSE UR STRIP-MCNC: NEGATIVE MG/DL
KETONES UR QL: NEGATIVE
LEUKOCYTE EST, POC: ABNORMAL
NITRITE UR-MCNC: NEGATIVE MG/ML
PH UR: 6 [PH] (ref 5–8)
PROT UR STRIP-MCNC: NEGATIVE MG/DL
RBC # UR STRIP: NEGATIVE /UL
SP GR UR: 1.01 (ref 1–1.03)
UROBILINOGEN UR QL: NORMAL

## 2018-11-14 PROCEDURE — 99214 OFFICE O/P EST MOD 30 MIN: CPT | Performed by: FAMILY MEDICINE

## 2018-11-14 PROCEDURE — 81003 URINALYSIS AUTO W/O SCOPE: CPT | Performed by: FAMILY MEDICINE

## 2018-11-14 PROCEDURE — 87086 URINE CULTURE/COLONY COUNT: CPT | Performed by: FAMILY MEDICINE

## 2018-11-14 NOTE — PATIENT INSTRUCTIONS
1.Continue Valium at night for sleep.    2.  Samples provided for overactive bladder at night and constipation.    3.  Urinalysis today.

## 2018-11-14 NOTE — PROGRESS NOTES
Subjective   Kasey Bass is a 89 y.o. female.     Chief Complaint   Patient presents with   • Follow-up       History of Present Illness     Kasey Bass presents today for follow-up for her depression. She reports she did not start taking the amitriptyline because she was concerned about possible side effects. Getting up at night a lot to urinate. She picked up the Bactrim and took it for her UTI, urine cleared up some. She does have some overactive bladder.    The following portions of the patient's history were reviewed and updated as appropriate: allergies, current medications, past family history, past medical history, past social history, past surgical history and problem list.    Active Ambulatory Problems     Diagnosis Date Noted   • Atopic rhinitis 06/16/2016   • Anxiety disorder 06/16/2016   • Atherosclerosis of coronary artery 06/16/2016   • Asthma 06/16/2016   • Constipation 06/16/2016   • Gastroparesis 06/16/2016   • Gastritis 06/16/2016   • Hearing impairment 06/16/2016   • Hyperlipidemia 06/16/2016   • Hypertension 06/16/2016   • Low back pain 06/16/2016   • Tinnitus 06/16/2016   • Type 2 diabetes mellitus (CMS/HCC) 06/16/2016   • Vitamin B deficiency 06/16/2016   • Vitamin D deficiency 06/16/2016   • Insomnia 06/24/2016   • Preventative health care 09/30/2016   • Depression 06/29/2017     Resolved Ambulatory Problems     Diagnosis Date Noted   • Acute bronchitis 06/16/2016   • Acute upper respiratory infection 06/16/2016   • Chest pain 06/16/2016   • Pain in joint 06/16/2016   • Candidiasis of skin 06/16/2016   • Hypoxia 06/24/2016   • DM II (diabetes mellitus, type II), controlled (CMS/[facility])    • Chest pain    • Abdominal pain 06/29/2017   • Viral bronchitis 02/27/2018   • Chest pain 02/27/2018     Past Medical History:   Diagnosis Date   • Allergic rhinitis Lifelong   • Anaphylactic reaction Remote   • Anxiety disorder 1985   • Asthma 1960   • CAD (coronary artery disease) 2004   • Chronic  constipation    • DM II (diabetes mellitus, type II), controlled (CMS/HCC)    • Fracture of lumbar spine (CMS/[facility])    • Gastroparesis Adulthood   • GERD (gastroesophageal reflux disease)    • Gluten-sensitive enteropathy    • Hearing impairment    • HLD (hyperlipidemia) Adulthood   • Hypertension    • Insomnia    • Labyrinthitis    • Lumbar scoliosis    • Scoliosis Adulthood   • Vitamin D deficiency      Past Surgical History:   Procedure Laterality Date   • APPENDECTOMY     • CATARACT EXTRACTION Bilateral    • CHOLECYSTECTOMY     • CORONARY ANGIOPLASTY      Small branch disease only     Family History   Problem Relation Age of Onset   • Diabetes Mother    • Heart disease Mother    • Stomach cancer Father    • Kidney cancer Sister    • Heart disease Son          age 50   • Cancer Maternal Grandmother    • Cancer Paternal Grandfather      Social History     Socioeconomic History   • Marital status:      Spouse name: Not on file   • Number of children: Not on file   • Years of education: Not on file   • Highest education level: Not on file   Social Needs   • Financial resource strain: Not on file   • Food insecurity - worry: Not on file   • Food insecurity - inability: Not on file   • Transportation needs - medical: Not on file   • Transportation needs - non-medical: Not on file   Occupational History   • Not on file   Tobacco Use   • Smoking status: Former Smoker     Packs/day: 0.25     Years: 22.00     Pack years: 5.50     Types: Cigarettes     Start date:      Last attempt to quit: 1966     Years since quittin.9   • Smokeless tobacco: Never Used   Substance and Sexual Activity   • Alcohol use: No   • Drug use: No   • Sexual activity: Not on file   Other Topics Concern   • Not on file   Social History Narrative    Domestic life   lives in private home with grown daughter.    .          Sabianism   Bahai        Sleep  "hygiene  in bed 10 PM to 8 AM for 6 hours nightly often broken        Caffeine use   1 cup of coffee daily        Exercise habits  walks daily as tolerated.  Stretches every morning.        Diet   low-salt diabetic diet.        Occupation   homemaker        Hearing  moderate impairment - improves with hearing aids.          Vision  corrects with bifocal glasses.        Dental : Upper dentures        Driving   local traffic daytime in good weather         Review of Systems   Constitutional: Negative for activity change.   Gastrointestinal: Negative for abdominal distention, anal bleeding and blood in stool.        Chronic IBS-C   Genitourinary: Positive for frequency.   Psychiatric/Behavioral: Positive for sleep disturbance. Negative for self-injury.       Objective   Blood pressure 138/82, pulse 82, height 157.5 cm (62.01\"), weight 60.8 kg (134 lb), SpO2 98 %, not currently breastfeeding.  Nursing note reviewed  Physical Exam  Const: NAD, A&Ox4,   Eyes: EOMI, no conjunctivitis  ENT: No nasal discharge present, neck supple- very hard of hearing  Cardiac: Regular rate and rhythm, no cyanosis  Resp: Respiratory rate within normal limits, no increased work of breathing, no audible wheezing or retractions noted  GI: No distention or ascites  MSK: Motor and sensation grossly intact in bilateral upper extremities  Neurologic: CN II-XII grossly intact  Psych: Mood is described as depressed, affect is appropriate and congruent  Skin: Pink, warm, dry  PHQ-9 Depression Screening  Little interest or pleasure in doing things? 0   Feeling down, depressed, or hopeless? 0   Trouble falling or staying asleep, or sleeping too much?     Feeling tired or having little energy?     Poor appetite or overeating?     Feeling bad about yourself - or that you are a failure or have let yourself or your family down?     Trouble concentrating on things, such as reading the newspaper or watching television?     Moving or speaking so slowly that " other people could have noticed? Or the opposite - being so fidgety or restless that you have been moving around a lot more than usual?     Thoughts that you would be better off dead, or of hurting yourself in some way?     PHQ-9 Total Score 0   If you checked off any problems, how difficult have these problems made it for you to do your work, take care of things at home, or get along with other people?       Procedures  Assessment/Plan   Kasey was seen today for follow-up.    Diagnoses and all orders for this visit:    Overactive bladder  -     Cancel: Urinalysis With Culture If Indicated - Urine, Clean Catch; Future  -     POCT urinalysis dipstick, automated    Dyssomnia    Current mild episode of major depressive disorder, unspecified whether recurrent (CMS/HCC)    Irritable bowel syndrome with constipation    Frequency of urination  -     Urine Culture - Urine, Urine, Clean Catch; Future        Acute concerns:  #1 depression  PHQ 9 improved from previous value of 11.  Much of this seemed to be related to the death of her  last year, as well as loneliness and isolation associated with this.  She should start the Elavil at night for anxiety and sleep.  In the meantime she may continue Valium at night for sleep.    #2 overactive bladder  Urinalysis and urine culture pending today.  She was given samples of Myrbetriq today    #4 IBS C  She did very well with the Linzess last time.  Samples of 72 µg capsules were provided to her again today    Patient Instructions   1.Continue Valium at night for sleep.    2.  Samples provided for overactive bladder at night and constipation.    3.  Urinalysis today.      Return if symptoms worsen or fail to improve.    Ambulatory progress note signed and attested to by Andrew Bethea D.O

## 2018-11-16 LAB — BACTERIA SPEC AEROBE CULT: NORMAL

## 2018-12-07 ENCOUNTER — TELEPHONE (OUTPATIENT)
Dept: FAMILY MEDICINE CLINIC | Facility: CLINIC | Age: 83
End: 2018-12-07

## 2018-12-07 DIAGNOSIS — M99.08 SOMATIC DYSFUNCTION OF RIB REGION: Primary | ICD-10-CM

## 2018-12-07 NOTE — TELEPHONE ENCOUNTER
Pt called requesting more Physical Therapy sessions at [facility] in [facility]

## 2018-12-26 ENCOUNTER — TELEPHONE (OUTPATIENT)
Dept: FAMILY MEDICINE CLINIC | Facility: CLINIC | Age: 83
End: 2018-12-26

## 2018-12-26 DIAGNOSIS — F41.1 GENERALIZED ANXIETY DISORDER: ICD-10-CM

## 2018-12-26 DIAGNOSIS — F51.01 PRIMARY INSOMNIA: ICD-10-CM

## 2018-12-26 RX ORDER — DIAZEPAM 5 MG/1
5 TABLET ORAL NIGHTLY PRN
Qty: 30 TABLET | Refills: 0 | Status: SHIPPED | OUTPATIENT
Start: 2018-12-26 | End: 2019-01-23 | Stop reason: SDUPTHER

## 2018-12-27 RX ORDER — DIAZEPAM 5 MG/1
5 TABLET ORAL NIGHTLY PRN
Qty: 30 TABLET | Refills: 1 | OUTPATIENT
Start: 2018-12-27

## 2019-01-23 ENCOUNTER — LAB (OUTPATIENT)
Dept: LAB | Facility: HOSPITAL | Age: 84
End: 2019-01-23

## 2019-01-23 ENCOUNTER — OFFICE VISIT (OUTPATIENT)
Dept: FAMILY MEDICINE CLINIC | Facility: CLINIC | Age: 84
End: 2019-01-23

## 2019-01-23 VITALS
SYSTOLIC BLOOD PRESSURE: 112 MMHG | DIASTOLIC BLOOD PRESSURE: 72 MMHG | WEIGHT: 138.6 LBS | BODY MASS INDEX: 25.34 KG/M2 | HEART RATE: 62 BPM | OXYGEN SATURATION: 97 %

## 2019-01-23 DIAGNOSIS — F51.01 PRIMARY INSOMNIA: ICD-10-CM

## 2019-01-23 DIAGNOSIS — F41.1 GENERALIZED ANXIETY DISORDER: ICD-10-CM

## 2019-01-23 DIAGNOSIS — R10.9 FLANK PAIN: ICD-10-CM

## 2019-01-23 DIAGNOSIS — R10.9 FLANK PAIN: Primary | ICD-10-CM

## 2019-01-23 LAB
ALBUMIN SERPL-MCNC: 4.44 G/DL (ref 3.2–4.8)
ALBUMIN/GLOB SERPL: 2.3 G/DL (ref 1.5–2.5)
ALP SERPL-CCNC: 79 U/L (ref 25–100)
ALT SERPL W P-5'-P-CCNC: 19 U/L (ref 7–40)
ANION GAP SERPL CALCULATED.3IONS-SCNC: 5 MMOL/L (ref 3–11)
AST SERPL-CCNC: 23 U/L (ref 0–33)
BASOPHILS # BLD AUTO: 0.03 10*3/MM3 (ref 0–0.2)
BASOPHILS NFR BLD AUTO: 0.5 % (ref 0–1)
BILIRUB BLD-MCNC: NEGATIVE MG/DL
BILIRUB SERPL-MCNC: 0.6 MG/DL (ref 0.3–1.2)
BUN BLD-MCNC: 11 MG/DL (ref 9–23)
BUN/CREAT SERPL: 10.5 (ref 7–25)
CALCIUM SPEC-SCNC: 9.2 MG/DL (ref 8.7–10.4)
CHLORIDE SERPL-SCNC: 107 MMOL/L (ref 99–109)
CLARITY, POC: CLEAR
CO2 SERPL-SCNC: 30 MMOL/L (ref 20–31)
COLOR UR: YELLOW
CREAT BLD-MCNC: 1.05 MG/DL (ref 0.6–1.3)
DEPRECATED RDW RBC AUTO: 41.9 FL (ref 37–54)
EOSINOPHIL # BLD AUTO: 0.05 10*3/MM3 (ref 0–0.3)
EOSINOPHIL NFR BLD AUTO: 0.8 % (ref 0–3)
ERYTHROCYTE [DISTWIDTH] IN BLOOD BY AUTOMATED COUNT: 13.2 % (ref 11.3–14.5)
GFR SERPL CREATININE-BSD FRML MDRD: 49 ML/MIN/1.73
GLOBULIN UR ELPH-MCNC: 2 GM/DL
GLUCOSE BLD-MCNC: 121 MG/DL (ref 70–100)
GLUCOSE UR STRIP-MCNC: ABNORMAL MG/DL
HCT VFR BLD AUTO: 42.2 % (ref 34.5–44)
HGB BLD-MCNC: 14.2 G/DL (ref 11.5–15.5)
IMM GRANULOCYTES # BLD AUTO: 0.01 10*3/MM3 (ref 0–0.03)
IMM GRANULOCYTES NFR BLD AUTO: 0.2 % (ref 0–0.6)
KETONES UR QL: NEGATIVE
LEUKOCYTE EST, POC: NEGATIVE
LYMPHOCYTES # BLD AUTO: 1.89 10*3/MM3 (ref 0.6–4.8)
LYMPHOCYTES NFR BLD AUTO: 29.2 % (ref 24–44)
MCH RBC QN AUTO: 29 PG (ref 27–31)
MCHC RBC AUTO-ENTMCNC: 33.6 G/DL (ref 32–36)
MCV RBC AUTO: 86.3 FL (ref 80–99)
MONOCYTES # BLD AUTO: 0.52 10*3/MM3 (ref 0–1)
MONOCYTES NFR BLD AUTO: 8 % (ref 0–12)
NEUTROPHILS # BLD AUTO: 3.97 10*3/MM3 (ref 1.5–8.3)
NEUTROPHILS NFR BLD AUTO: 61.3 % (ref 41–71)
NITRITE UR-MCNC: NEGATIVE MG/ML
PH UR: 6 [PH] (ref 5–8)
PLATELET # BLD AUTO: 177 10*3/MM3 (ref 150–450)
PMV BLD AUTO: 9.6 FL (ref 6–12)
POTASSIUM BLD-SCNC: 5.1 MMOL/L (ref 3.5–5.5)
PROT SERPL-MCNC: 6.4 G/DL (ref 5.7–8.2)
PROT UR STRIP-MCNC: NEGATIVE MG/DL
RBC # BLD AUTO: 4.89 10*6/MM3 (ref 3.89–5.14)
RBC # UR STRIP: NEGATIVE /UL
SODIUM BLD-SCNC: 142 MMOL/L (ref 132–146)
SP GR UR: 1.01 (ref 1–1.03)
UROBILINOGEN UR QL: NORMAL
WBC NRBC COR # BLD: 6.47 10*3/MM3 (ref 3.5–10.8)

## 2019-01-23 PROCEDURE — 99214 OFFICE O/P EST MOD 30 MIN: CPT | Performed by: FAMILY MEDICINE

## 2019-01-23 PROCEDURE — 81003 URINALYSIS AUTO W/O SCOPE: CPT | Performed by: FAMILY MEDICINE

## 2019-01-23 PROCEDURE — 80053 COMPREHEN METABOLIC PANEL: CPT | Performed by: FAMILY MEDICINE

## 2019-01-23 PROCEDURE — 85025 COMPLETE CBC W/AUTO DIFF WBC: CPT | Performed by: FAMILY MEDICINE

## 2019-01-23 RX ORDER — DIAZEPAM 5 MG/1
5 TABLET ORAL NIGHTLY PRN
Qty: 30 TABLET | Refills: 2 | Status: SHIPPED | OUTPATIENT
Start: 2019-01-23 | End: 2019-04-24 | Stop reason: SDUPTHER

## 2019-01-24 ENCOUNTER — TELEPHONE (OUTPATIENT)
Dept: FAMILY MEDICINE CLINIC | Facility: CLINIC | Age: 84
End: 2019-01-24

## 2019-01-24 NOTE — TELEPHONE ENCOUNTER
Let patient know Dr. Bethea will be out of the office until Monday. I do not see any lab results that are require immediate attention. Let her know he will mail results with his comments when he is back in the office.  Send note back to Dr. Bethea  Thanks

## 2019-01-24 NOTE — TELEPHONE ENCOUNTER
Pt called about the lab results. Pt would like a copy sent in the mail.     I called pt about her umbrella, no answer.   If she calls back. Its a blue umbrella upstairs in the  area by the doctors folders.

## 2019-01-24 NOTE — TELEPHONE ENCOUNTER
Can you review labs that was resulted yesterday in pts chart? [facility] is out and won't return til Monday. It will be pass the 72 hour miguel angel period for review. Pt is requesting a copy of results.

## 2019-04-24 DIAGNOSIS — F41.1 GENERALIZED ANXIETY DISORDER: ICD-10-CM

## 2019-04-24 DIAGNOSIS — F51.01 PRIMARY INSOMNIA: ICD-10-CM

## 2019-04-25 RX ORDER — DIAZEPAM 5 MG/1
5 TABLET ORAL NIGHTLY PRN
Qty: 30 TABLET | Refills: 2 | Status: SHIPPED | OUTPATIENT
Start: 2019-04-25 | End: 2019-07-12 | Stop reason: SDUPTHER

## 2019-07-12 ENCOUNTER — OFFICE VISIT (OUTPATIENT)
Dept: FAMILY MEDICINE CLINIC | Facility: CLINIC | Age: 84
End: 2019-07-12

## 2019-07-12 ENCOUNTER — HOSPITAL ENCOUNTER (OUTPATIENT)
Dept: CT IMAGING | Facility: HOSPITAL | Age: 84
Discharge: HOME | End: 2019-07-12
Admitting: FAMILY MEDICINE

## 2019-07-12 VITALS
DIASTOLIC BLOOD PRESSURE: 86 MMHG | BODY MASS INDEX: 25.58 KG/M2 | SYSTOLIC BLOOD PRESSURE: 142 MMHG | WEIGHT: 139 LBS | OXYGEN SATURATION: 98 % | HEART RATE: 96 BPM | HEIGHT: 62 IN

## 2019-07-12 DIAGNOSIS — R10.12 LEFT UPPER QUADRANT PAIN: ICD-10-CM

## 2019-07-12 DIAGNOSIS — I10 ESSENTIAL HYPERTENSION: ICD-10-CM

## 2019-07-12 DIAGNOSIS — G89.29 CHRONIC BILATERAL LOW BACK PAIN WITHOUT SCIATICA: ICD-10-CM

## 2019-07-12 DIAGNOSIS — E78.2 MIXED HYPERLIPIDEMIA: ICD-10-CM

## 2019-07-12 DIAGNOSIS — F51.01 PRIMARY INSOMNIA: ICD-10-CM

## 2019-07-12 DIAGNOSIS — M54.50 CHRONIC BILATERAL LOW BACK PAIN WITHOUT SCIATICA: ICD-10-CM

## 2019-07-12 DIAGNOSIS — K55.1 CHRONIC MESENTERIC ISCHEMIA (HCC): ICD-10-CM

## 2019-07-12 DIAGNOSIS — E11.9 TYPE 2 DIABETES MELLITUS WITHOUT COMPLICATION, WITHOUT LONG-TERM CURRENT USE OF INSULIN (HCC): ICD-10-CM

## 2019-07-12 DIAGNOSIS — F41.1 GENERALIZED ANXIETY DISORDER: Primary | ICD-10-CM

## 2019-07-12 DIAGNOSIS — F41.1 GENERALIZED ANXIETY DISORDER: ICD-10-CM

## 2019-07-12 LAB — CREAT BLDA-MCNC: 1 MG/DL (ref 0.6–1.3)

## 2019-07-12 PROCEDURE — 25010000002 IOPAMIDOL 61 % SOLUTION: Performed by: FAMILY MEDICINE

## 2019-07-12 PROCEDURE — 74177 CT ABD & PELVIS W/CONTRAST: CPT

## 2019-07-12 PROCEDURE — 99214 OFFICE O/P EST MOD 30 MIN: CPT | Performed by: FAMILY MEDICINE

## 2019-07-12 PROCEDURE — 82565 ASSAY OF CREATININE: CPT

## 2019-07-12 RX ORDER — DIAZEPAM 5 MG/1
5 TABLET ORAL NIGHTLY PRN
Qty: 30 TABLET | Refills: 2 | Status: SHIPPED | OUTPATIENT
Start: 2019-07-12 | End: 2019-10-21 | Stop reason: SDUPTHER

## 2019-07-12 RX ADMIN — IOPAMIDOL 95 ML: 612 INJECTION, SOLUTION INTRAVENOUS at 14:22

## 2019-07-12 NOTE — TELEPHONE ENCOUNTER
She was seen today  and forget to get a refill on valium 5 mg.     Last fill:  Last office visit:7/12/19  Next office visit:?  Date of last Gonzalez:12/26/18  CSA up-to-date? No not recnet on file  Date of last UDS: none in her chart   UDS consistent:

## 2019-07-12 NOTE — PROGRESS NOTES
Subjective   Kasey Bass is a 89 y.o. female.     Chief Complaint   Patient presents with   • Back Pain     x a few months    • Abdominal Pain       History of Present Illness     Kasey Bass presents today for severe left flank pain and abdominal pain.  She has been seen previously for this, and it has been worsening over the last 6 months.  She denies any dysuria.  She has been taking aspirin at home with some modest relief, but it has no longer been effective.  The pain gets up to an 8/10 in severity, and is keeping her from eating.  She has not had any significant weight loss.    The following portions of the patient's history were reviewed and updated as appropriate: allergies, current medications, past family history, past medical history, past social history, past surgical history and problem list.    Active Ambulatory Problems     Diagnosis Date Noted   • Atopic rhinitis 06/16/2016   • Anxiety disorder 06/16/2016   • Atherosclerosis of coronary artery 06/16/2016   • Asthma 06/16/2016   • Constipation 06/16/2016   • Gastroparesis 06/16/2016   • Gastritis 06/16/2016   • Hearing impairment 06/16/2016   • Hyperlipidemia 06/16/2016   • Hypertension 06/16/2016   • Low back pain 06/16/2016   • Tinnitus 06/16/2016   • Type 2 diabetes mellitus (CMS/HCC) 06/16/2016   • Vitamin B deficiency 06/16/2016   • Vitamin D deficiency 06/16/2016   • Insomnia 06/24/2016   • Preventative health care 09/30/2016   • Depression 06/29/2017     Resolved Ambulatory Problems     Diagnosis Date Noted   • Acute bronchitis 06/16/2016   • Acute upper respiratory infection 06/16/2016   • Chest pain 06/16/2016   • Pain in joint 06/16/2016   • Candidiasis of skin 06/16/2016   • Hypoxia 06/24/2016   • DM II (diabetes mellitus, type II), controlled (CMS/[facility])    • Chest pain    • Abdominal pain 06/29/2017   • Viral bronchitis 02/27/2018   • Chest pain 02/27/2018     Past Medical History:   Diagnosis Date   • Allergic rhinitis Lifelong   •  Anaphylactic reaction Remote   • Anxiety disorder    • Asthma    • CAD (coronary artery disease)    • Chronic constipation    • DM II (diabetes mellitus, type II), controlled (CMS/HCC)    • Fracture of lumbar spine (CMS/[facility])    • Gastroparesis Adulthood   • GERD (gastroesophageal reflux disease)    • Gluten-sensitive enteropathy    • Hearing impairment    • HLD (hyperlipidemia) Adulthood   • Hypertension    • Insomnia    • Labyrinthitis    • Lumbar scoliosis    • Scoliosis Adulthood   • Vitamin D deficiency      Past Surgical History:   Procedure Laterality Date   • APPENDECTOMY     • CATARACT EXTRACTION Bilateral    • CHOLECYSTECTOMY     • CORONARY ANGIOPLASTY  2004    Small branch disease only     Family History   Problem Relation Age of Onset   • Diabetes Mother    • Heart disease Mother    • Stomach cancer Father    • Kidney cancer Sister    • Heart disease Son          age 50   • Cancer Maternal Grandmother    • Cancer Paternal Grandfather      Social History     Socioeconomic History   • Marital status:      Spouse name: Not on file   • Number of children: Not on file   • Years of education: Not on file   • Highest education level: Not on file   Tobacco Use   • Smoking status: Former Smoker     Packs/day: 0.25     Years: 22.00     Pack years: 5.50     Types: Cigarettes     Start date:      Last attempt to quit: 1966     Years since quittin.5   • Smokeless tobacco: Never Used   Substance and Sexual Activity   • Alcohol use: No   • Drug use: No   Social History Narrative    Domestic life   lives in private home with grown daughter.    2017.          Orthodoxy   Jain        Sleep hygiene  in bed 10 PM to 8 AM for 6 hours nightly often broken        Caffeine use   1 cup of coffee daily        Exercise habits  walks daily as tolerated.  Stretches every morning.        Diet   low-salt diabetic diet.         "Occupation   homemaker        Hearing  moderate impairment - improves with hearing aids.          Vision  corrects with bifocal glasses.        Dental : Upper dentures        Driving   local traffic daytime in good weather         Review of Systems   Constitutional: Negative.    Gastrointestinal: Negative.    Genitourinary: Positive for flank pain. Negative for decreased urine volume, difficulty urinating, frequency and urgency.       Objective   Blood pressure 142/86, pulse 96, height 157.5 cm (62\"), weight 63 kg (139 lb), SpO2 98 %, not currently breastfeeding.  Nursing note reviewed  Physical Exam  Const: NAD, A&Ox4, Pleasant, Cooperative  Eyes: EOMI, no conjunctivitis  ENT: No nasal discharge present, neck supple  Cardiac: Regular rate and rhythm, no cyanosis  Resp: Respiratory rate within normal limits, no increased work of breathing, no audible wheezing or retractions noted  GI: No distention or ascites  MSK: Pain elicited with palpation over left external obliques and left inferior ribs  Neurologic: CN II-XII grossly intact  Psych: Appropriate mood and behavior.  Skin: Pink, warm, dry  Procedures  Assessment/Plan   Kasey was seen today for back pain and abdominal pain.    Diagnoses and all orders for this visit:    Generalized anxiety disorder    Chronic bilateral low back pain without sciatica    Mixed hyperlipidemia  -     Comprehensive Metabolic Panel; Future  -     Lipid Panel; Future    Essential hypertension  -     Comprehensive Metabolic Panel; Future  -     Urinalysis With Microscopic If Indicated (No Culture) - Urine, Clean Catch; Future    Type 2 diabetes mellitus without complication, without long-term current use of insulin (CMS/[facility])  -     Hemoglobin A1c; Future  -     Urinalysis With Microscopic If Indicated (No Culture) - Urine, Clean Catch; Future    Left upper quadrant pain  -     CBC & Differential; Future  -     CT Abdomen Pelvis With Contrast; Future    Chronic mesenteric ischemia " (CMS/[facility])  -     MRI Angiogram Abdomen With Contrast; Future        Acute concerns:  #1 flank pain  Previously diagnosed with mechanical back/side pain, and recommended that she flipped her mattress.  This was effective initially, but her pain has persisted now.    -CT of the abdomen shows some severe atherosclerosis of the superior mesenteric artery  -Symptoms are consistent with chronic mesenteric ischemia.  Recommend an MRA    #2 diabetes mellitus  Adequate control    3.  Hyperlipidemia  Check lipid profile today    4.  Hypertension  Adequate control for age  Patient Instructions   .1.  Continue medications and supplements - as listed.    2.  Continue well-balanced diet - low in calories and low in added sugar.    3.  Maintain a routine exercise program - every week.    4.  A letter will be sent with your test results.    5.  Use Bufferin for back pain.    6.  Bring hearing aids into the office to see if we can help.    7.  You will be contacted to schedule a CT scan of abdomen.      Return in about 6 months (around 1/12/2020).    Ambulatory progress note signed and attested to by Andrew Bethea D.O.

## 2019-07-12 NOTE — PATIENT INSTRUCTIONS
.1.  Continue medications and supplements - as listed.    2.  Continue well-balanced diet - low in calories and low in added sugar.    3.  Maintain a routine exercise program - every week.    4.  A letter will be sent with your test results.    5.  Use Bufferin for back pain.    6.  Bring hearing aids into the office to see if we can help.    7.  You will be contacted to schedule a CT scan of abdomen.

## 2019-07-15 ENCOUNTER — TELEPHONE (OUTPATIENT)
Dept: FAMILY MEDICINE CLINIC | Facility: CLINIC | Age: 84
End: 2019-07-15

## 2019-07-15 NOTE — TELEPHONE ENCOUNTER
Contacted PT's daughter, she stated she had just gotten off the phone with Dr. Bethea who provided her with the results and would be ordering a follow up MRI

## 2019-07-17 NOTE — TELEPHONE ENCOUNTER
There was some atherosclerosis in 1 of the arteries in her abdomen that may be responsible for her symptoms.  An MRI of the blood vessels is necessary to check the flow.  We can mail her the CT report if she desires.

## 2019-07-17 NOTE — TELEPHONE ENCOUNTER
PATIENT WANTS A WRITTEN REPORT OF THE RESULTS AND WHY SHE NEEDS AN ORDER FOR THE MRI. CONFUSED ON WHAT DR PFEIFFER SAID TO HER DAUGHTER.

## 2019-08-12 DIAGNOSIS — K55.1 CHRONIC MESENTERIC ISCHEMIA (HCC): Primary | ICD-10-CM

## 2019-10-21 DIAGNOSIS — F51.01 PRIMARY INSOMNIA: ICD-10-CM

## 2019-10-21 DIAGNOSIS — F41.1 GENERALIZED ANXIETY DISORDER: ICD-10-CM

## 2019-10-21 RX ORDER — DIAZEPAM 5 MG/1
5 TABLET ORAL NIGHTLY PRN
Qty: 14 TABLET | Refills: 0 | Status: SHIPPED | OUTPATIENT
Start: 2019-10-21 | End: 2019-11-05 | Stop reason: SDUPTHER

## 2019-10-21 NOTE — TELEPHONE ENCOUNTER
Patient called stating she only has 1 pill left on her diazepam 5mg. She would like to know if she can have enough called in to make it to her appointment next Thursday 10/31/19. If it can be sent in it needs sent to [facility] in [place]. Please call the patient back at [phone]

## 2019-11-01 ENCOUNTER — TELEPHONE (OUTPATIENT)
Dept: FAMILY MEDICINE CLINIC | Facility: CLINIC | Age: 84
End: 2019-11-01

## 2019-11-01 DIAGNOSIS — F41.1 GENERALIZED ANXIETY DISORDER: ICD-10-CM

## 2019-11-01 DIAGNOSIS — F51.01 PRIMARY INSOMNIA: ICD-10-CM

## 2019-11-01 NOTE — TELEPHONE ENCOUNTER
Last fill:10/21/19  Last office visit:7/12/19  Next office visit:none  Date of last Gonzalez:today  CSA up-to-date? 6/29/17  Date of last UDS:none   UDS consistent:

## 2019-11-01 NOTE — TELEPHONE ENCOUNTER
DIAZEPAM 5 MG, TAKE AS NEEDED FOR SLEEP.  30 DAY SUPPLY    [facility].     PATIENT REQUESTING A CALL IF THERE IS A PROBLEM GETTING THE MEDICATION.     CANNOT FIND ANYONE TO TAKE HER IN TO SEE US. IS IN GREAT PAIN AT NIGHT.

## 2019-11-05 RX ORDER — DIAZEPAM 5 MG/1
5 TABLET ORAL NIGHTLY PRN
Qty: 30 TABLET | Refills: 5 | Status: SHIPPED | OUTPATIENT
Start: 2019-11-05 | End: 2020-05-08 | Stop reason: SDUPTHER

## 2019-11-15 ENCOUNTER — TELEPHONE (OUTPATIENT)
Dept: FAMILY MEDICINE CLINIC | Facility: CLINIC | Age: 84
End: 2019-11-15

## 2019-11-15 DIAGNOSIS — F41.1 GENERALIZED ANXIETY DISORDER: ICD-10-CM

## 2019-11-15 DIAGNOSIS — F51.01 PRIMARY INSOMNIA: ICD-10-CM

## 2019-11-15 RX ORDER — DIAZEPAM 5 MG/1
5 TABLET ORAL NIGHTLY PRN
Qty: 30 TABLET | Refills: 5 | Status: CANCELLED | OUTPATIENT
Start: 2019-11-15

## 2019-11-15 NOTE — TELEPHONE ENCOUNTER
Last script sent 11/5/19 with #30  Called pharmacy and confirmed rx ready for     Called and informed pt

## 2019-12-27 ENCOUNTER — TELEPHONE (OUTPATIENT)
Dept: FAMILY MEDICINE CLINIC | Facility: CLINIC | Age: 84
End: 2019-12-27

## 2020-01-03 NOTE — TELEPHONE ENCOUNTER
Have you seen these records in Dr Bethea folder? EDS was trying to take care this while he was out but I requested the records and haven't seen them.

## 2020-03-27 ENCOUNTER — TELEPHONE (OUTPATIENT)
Dept: FAMILY MEDICINE CLINIC | Facility: CLINIC | Age: 85
End: 2020-03-27

## 2020-03-27 NOTE — TELEPHONE ENCOUNTER
Would you like to keep the order for MRI Angiogram Abdomen With & Without Contrast placed 8/12/19

## 2020-05-08 ENCOUNTER — TELEPHONE (OUTPATIENT)
Dept: FAMILY MEDICINE CLINIC | Facility: CLINIC | Age: 85
End: 2020-05-08

## 2020-05-08 DIAGNOSIS — F51.01 PRIMARY INSOMNIA: ICD-10-CM

## 2020-05-08 DIAGNOSIS — F41.1 GENERALIZED ANXIETY DISORDER: ICD-10-CM

## 2020-05-08 RX ORDER — DIAZEPAM 5 MG/1
TABLET ORAL
Qty: 30 TABLET | Refills: 5 | OUTPATIENT
Start: 2020-05-08

## 2020-05-08 RX ORDER — DIAZEPAM 5 MG/1
5 TABLET ORAL NIGHTLY PRN
Qty: 30 TABLET | Refills: 5 | Status: SHIPPED | OUTPATIENT
Start: 2020-05-08 | End: 2020-10-26 | Stop reason: SDUPTHER

## 2020-05-08 NOTE — TELEPHONE ENCOUNTER
Pt called to request a refill of     diazePAM (VALIUM) 5 MG tablet    [facility]  PH: [phone] FAX: [phone]

## 2020-05-08 NOTE — TELEPHONE ENCOUNTER
Last fill: 11/5/19  Last office visit: 7/12/19  Next office visit: 5/11/20  Date of last Gonzalez: 7/12/19  CSA up-to-date? Needs updating  Date of last UDS: none  UDS consistent: n/a

## 2020-06-23 ENCOUNTER — TELEPHONE (OUTPATIENT)
Dept: FAMILY MEDICINE CLINIC | Facility: CLINIC | Age: 85
End: 2020-06-23

## 2020-06-23 NOTE — TELEPHONE ENCOUNTER
Patient states that she went to the ER at [facility] for a cat bite and needs a follow-up.  She can be reached at [phone]

## 2020-06-26 ENCOUNTER — HOSPITAL ENCOUNTER (OUTPATIENT)
Dept: GENERAL RADIOLOGY | Facility: HOSPITAL | Age: 85
Discharge: HOME | End: 2020-06-26
Admitting: FAMILY MEDICINE

## 2020-06-26 ENCOUNTER — OFFICE VISIT (OUTPATIENT)
Dept: FAMILY MEDICINE CLINIC | Facility: CLINIC | Age: 85
End: 2020-06-26

## 2020-06-26 VITALS
SYSTOLIC BLOOD PRESSURE: 128 MMHG | WEIGHT: 130 LBS | HEIGHT: 62 IN | BODY MASS INDEX: 23.92 KG/M2 | DIASTOLIC BLOOD PRESSURE: 80 MMHG | HEART RATE: 81 BPM | OXYGEN SATURATION: 97 %

## 2020-06-26 DIAGNOSIS — M54.9 MID BACK PAIN ON LEFT SIDE: ICD-10-CM
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
## 2020-06-26 DIAGNOSIS — M54.9 MID BACK PAIN ON LEFT SIDE: Primary | ICD-10-CM

## 2020-06-26 PROCEDURE — 72080 X-RAY EXAM THORACOLMB 2/> VW: CPT

## 2020-06-26 PROCEDURE — 99213 OFFICE O/P EST LOW 20 MIN: CPT | Performed by: FAMILY MEDICINE

## 2020-06-26 NOTE — PROGRESS NOTES
Subjective   Kasey Bass is a 89 y.o. female.     Chief Complaint   Patient presents with   • ER follow up   • Animal Bite   • Back Pain       History of Present Illness     Kasey Bass presents today for   Chief Complaint   Patient presents with   • ER follow up   • Animal Bite   • Back Pain     She had a fall about 3 months ago in the garage, followed by a fall about 1 month ago.  She is still having some discomfort in her mid back on the left side.  She sustained a cat bite last week, was initially prescribed doxycycline and had some significant swelling in the right hand the next day.  She was taken to the ER in [place] and received a dose of IV vancomycin.  It has improved dramatically since that time.  She has 2 more days of doxycycline left.    This patient is accompanied by their daughter who is a registered nurse who contributes to the history of their care.    The following portions of the patient's history were reviewed and updated as appropriate: allergies, current medications, past family history, past medical history, past social history, past surgical history and problem list.    Active Ambulatory Problems     Diagnosis Date Noted   • Atopic rhinitis 06/16/2016   • Anxiety disorder 06/16/2016   • Atherosclerosis of coronary artery 06/16/2016   • Asthma 06/16/2016   • Constipation 06/16/2016   • Gastroparesis 06/16/2016   • Gastritis 06/16/2016   • Hearing impairment 06/16/2016   • Hyperlipidemia 06/16/2016   • Hypertension 06/16/2016   • Low back pain 06/16/2016   • Tinnitus 06/16/2016   • Type 2 diabetes mellitus (CMS/[facility]) 06/16/2016   • Vitamin B deficiency 06/16/2016   • Vitamin D deficiency 06/16/2016   • Insomnia 06/24/2016   • Preventative health care 09/30/2016   • Depression 06/29/2017     Resolved Ambulatory Problems     Diagnosis Date Noted   • Acute bronchitis 06/16/2016   • Acute upper respiratory infection 06/16/2016   • Chest pain 06/16/2016   • Pain in joint 06/16/2016   •  Candidiasis of skin 2016   • Hypoxia 2016   • DM II (diabetes mellitus, type II), controlled (CMS/[facility])    • Chest pain    • Abdominal pain 2017   • Viral bronchitis 2018   • Chest pain 2018     Past Medical History:   Diagnosis Date   • Allergic rhinitis Lifelong   • Anaphylactic reaction Remote   • CAD (coronary artery disease)    • Chronic constipation    • Fracture of lumbar spine (CMS/[facility])    • GERD (gastroesophageal reflux disease)    • Gluten-sensitive enteropathy    • HLD (hyperlipidemia) Adulthood   • Labyrinthitis    • Lumbar scoliosis    • Scoliosis Adulthood     Past Surgical History:   Procedure Laterality Date   • APPENDECTOMY     • CATARACT EXTRACTION Bilateral    • CHOLECYSTECTOMY     • CORONARY ANGIOPLASTY      Small branch disease only     Family History   Problem Relation Age of Onset   • Diabetes Mother    • Heart disease Mother    • Stomach cancer Father    • Kidney cancer Sister    • Heart disease Son          age 50   • Cancer Maternal Grandmother    • Cancer Paternal Grandfather      Social History     Socioeconomic History   • Marital status:      Spouse name: Not on file   • Number of children: Not on file   • Years of education: Not on file   • Highest education level: Not on file   Tobacco Use   • Smoking status: Former Smoker     Packs/day: 0.25     Years: 22.00     Pack years: 5.50     Types: Cigarettes     Start date:      Last attempt to quit: 1966     Years since quittin.5   • Smokeless tobacco: Never Used   Substance and Sexual Activity   • Alcohol use: No   • Drug use: No   Social History Narrative    Domestic life   lives in private home with grown daughter.    .          Catholic   Roman [employer]        Sleep hygiene  in bed 10 PM to 8 AM for 6 hours nightly often broken        Caffeine use   1 cup of coffee daily        Exercise habits  walks daily as tolerated.  Stretches  "every morning.        Diet   low-salt diabetic diet.        Occupation   homemaker        Hearing  moderate impairment - improves with hearing aids.          Vision  corrects with bifocal glasses.        Dental : Upper dentures        Driving   local traffic daytime in good weather       Review of Systems  Review of Systems -  General ROS: negative for - chills, fever or night sweats  Cardiovascular ROS: no chest pain or dyspnea on exertion  Gastrointestinal ROS: no abdominal pain, change in bowel habits, or black or bloody stools  Genito-Urinary ROS: no trouble voiding or gross hematuria    Objective   Blood pressure 128/80, pulse 81, height 157.5 cm (62\"), weight 59 kg (130 lb), SpO2 97 %, not currently breastfeeding.  Nursing note reviewed  Physical Exam  Const: NAD, A&Ox4, Pleasant, Cooperative  Eyes: EOMI, no conjunctivitis  ENT: No nasal discharge present, neck supple  Cardiac: Regular rate and rhythm, no cyanosis  Resp: Respiratory rate within normal limits, no increased work of breathing, no audible wheezing or retractions noted  GI: No distention or ascites  Procedures  Assessment/Plan     Problem List Items Addressed This Visit     None      Visit Diagnoses     Mid back pain on left side    -  Primary    Given persistence at 3 months, x-ray today to check for fractures    Relevant Orders    XR Spine Thoracolumbar 2 View    Cat bite, initial encounter        Improved dramatically, finished doxycycline.  Follow-up as needed.        See patient diagnoses and orders along with patient instructions for assessment, plan, and changes to care for patient.    There are no Patient Instructions on file for this visit.    No follow-ups on file.    Ambulatory progress note signed and attested to by Andrew Bethea D.O.         "

## 2020-10-26 DIAGNOSIS — F51.01 PRIMARY INSOMNIA: ICD-10-CM

## 2020-10-26 DIAGNOSIS — F41.1 GENERALIZED ANXIETY DISORDER: ICD-10-CM

## 2020-10-26 NOTE — TELEPHONE ENCOUNTER
Caller: Kasey Bass    Relationship: Self    Best call back number: [phone]    Medication needed:   Requested Prescriptions     Pending Prescriptions Disp Refills   • diazePAM (VALIUM) 5 MG tablet 30 tablet 5     Sig: Take 1 tablet by mouth At Night As Needed for Sleep.       When do you need the refill by: ASAP    What details did the patient provide when requesting the medication: 1 DAY LEFT  Does the patient have less than a 3 day supply:  [x] Yes  [] No    What is the patient's preferred pharmacy: [facility] [address] - [phone]  - [phone] FX       PLEASE CALL PATIENT WHEN CONFIRM  [phone]         ALSO, THE PATIENT STATES SHE APPRECIATES DR PFEIFFER AND THAT HE IS A REAL GENTLEMAN AND A GREAT DOCTOR.

## 2020-10-27 NOTE — TELEPHONE ENCOUNTER
Last fill: 5/28/2020  Last office visit: 6/26/20  Next office visit:not scheduled  CSA up-to-date? no  Date of last UDS: none  UDS consistent: n/a      Contacted patient's daughter and scheduled an upcoming appt.She has requested a refill until 11/4

## 2020-10-28 RX ORDER — DIAZEPAM 5 MG/1
5 TABLET ORAL NIGHTLY PRN
Qty: 30 TABLET | Refills: 2 | Status: SHIPPED | OUTPATIENT
Start: 2020-10-28 | End: 2021-01-18 | Stop reason: SDUPTHER

## 2020-11-16 ENCOUNTER — OFFICE VISIT (OUTPATIENT)
Dept: FAMILY MEDICINE CLINIC | Facility: CLINIC | Age: 85
End: 2020-11-16

## 2020-11-16 ENCOUNTER — LAB (OUTPATIENT)
Dept: LAB | Facility: HOSPITAL | Age: 85
End: 2020-11-16

## 2020-11-16 VITALS
HEIGHT: 62 IN | SYSTOLIC BLOOD PRESSURE: 122 MMHG | DIASTOLIC BLOOD PRESSURE: 64 MMHG | TEMPERATURE: 97.8 F | WEIGHT: 131.8 LBS | RESPIRATION RATE: 16 BRPM | BODY MASS INDEX: 24.25 KG/M2 | OXYGEN SATURATION: 96 % | HEART RATE: 74 BPM

## 2020-11-16 DIAGNOSIS — I10 ESSENTIAL HYPERTENSION: ICD-10-CM

## 2020-11-16 DIAGNOSIS — E11.9 TYPE 2 DIABETES MELLITUS WITHOUT COMPLICATION, WITHOUT LONG-TERM CURRENT USE OF INSULIN (HCC): ICD-10-CM

## 2020-11-16 DIAGNOSIS — E53.9 VITAMIN B DEFICIENCY: ICD-10-CM

## 2020-11-16 DIAGNOSIS — E78.2 MIXED HYPERLIPIDEMIA: ICD-10-CM

## 2020-11-16 DIAGNOSIS — E11.9 TYPE 2 DIABETES MELLITUS WITHOUT COMPLICATION, WITHOUT LONG-TERM CURRENT USE OF INSULIN (HCC): Primary | ICD-10-CM

## 2020-11-16 DIAGNOSIS — E55.9 VITAMIN D DEFICIENCY: ICD-10-CM

## 2020-11-16 LAB
ALBUMIN SERPL-MCNC: 4.5 G/DL (ref 3.5–5.2)
ALBUMIN/GLOB SERPL: 1.9 G/DL
ALP SERPL-CCNC: 76 U/L (ref 39–117)
ALT SERPL W P-5'-P-CCNC: 14 U/L (ref 1–33)
ANION GAP SERPL CALCULATED.3IONS-SCNC: 9.5 MMOL/L (ref 5–15)
AST SERPL-CCNC: 21 U/L (ref 1–32)
BASOPHILS # BLD AUTO: 0.04 10*3/MM3 (ref 0–0.2)
BASOPHILS NFR BLD AUTO: 0.6 % (ref 0–1.5)
BILIRUB SERPL-MCNC: 0.4 MG/DL (ref 0–1.2)
BUN SERPL-MCNC: 17 MG/DL (ref 8–23)
BUN/CREAT SERPL: 15.6 (ref 7–25)
CALCIUM SPEC-SCNC: 10.4 MG/DL (ref 8.2–9.6)
CHLORIDE SERPL-SCNC: 103 MMOL/L (ref 98–107)
CHOLEST SERPL-MCNC: 219 MG/DL (ref 0–200)
CO2 SERPL-SCNC: 27.5 MMOL/L (ref 22–29)
CREAT SERPL-MCNC: 1.09 MG/DL (ref 0.57–1)
DEPRECATED RDW RBC AUTO: 38.9 FL (ref 37–54)
EOSINOPHIL # BLD AUTO: 0.04 10*3/MM3 (ref 0–0.4)
EOSINOPHIL NFR BLD AUTO: 0.6 % (ref 0.3–6.2)
ERYTHROCYTE [DISTWIDTH] IN BLOOD BY AUTOMATED COUNT: 12.8 % (ref 12.3–15.4)
EXPIRATION DATE: NORMAL
GFR SERPL CREATININE-BSD FRML MDRD: 47 ML/MIN/1.73
GLOBULIN UR ELPH-MCNC: 2.4 GM/DL
GLUCOSE SERPL-MCNC: 123 MG/DL (ref 65–99)
HBA1C MFR BLD: 6.7 %
HCT VFR BLD AUTO: 42.3 % (ref 34–46.6)
HDLC SERPL-MCNC: 73 MG/DL (ref 40–60)
HGB BLD-MCNC: 14.4 G/DL (ref 12–15.9)
IMM GRANULOCYTES # BLD AUTO: 0.02 10*3/MM3 (ref 0–0.05)
IMM GRANULOCYTES NFR BLD AUTO: 0.3 % (ref 0–0.5)
LDLC SERPL CALC-MCNC: 131 MG/DL (ref 0–100)
LDLC/HDLC SERPL: 1.76 {RATIO}
LYMPHOCYTES # BLD AUTO: 1.82 10*3/MM3 (ref 0.7–3.1)
LYMPHOCYTES NFR BLD AUTO: 27.1 % (ref 19.6–45.3)
Lab: NORMAL
MCH RBC QN AUTO: 28.9 PG (ref 26.6–33)
MCHC RBC AUTO-ENTMCNC: 34 G/DL (ref 31.5–35.7)
MCV RBC AUTO: 84.9 FL (ref 79–97)
MONOCYTES # BLD AUTO: 0.49 10*3/MM3 (ref 0.1–0.9)
MONOCYTES NFR BLD AUTO: 7.3 % (ref 5–12)
NEUTROPHILS NFR BLD AUTO: 4.31 10*3/MM3 (ref 1.7–7)
NEUTROPHILS NFR BLD AUTO: 64.1 % (ref 42.7–76)
NRBC BLD AUTO-RTO: 0 /100 WBC (ref 0–0.2)
PLATELET # BLD AUTO: 177 10*3/MM3 (ref 140–450)
PMV BLD AUTO: 9.9 FL (ref 6–12)
POTASSIUM SERPL-SCNC: 5 MMOL/L (ref 3.5–5.2)
PROT SERPL-MCNC: 6.9 G/DL (ref 6–8.5)
RBC # BLD AUTO: 4.98 10*6/MM3 (ref 3.77–5.28)
SODIUM SERPL-SCNC: 140 MMOL/L (ref 136–145)
TRIGL SERPL-MCNC: 86 MG/DL (ref 0–150)
TSH SERPL DL<=0.05 MIU/L-ACNC: 2.71 UIU/ML (ref 0.27–4.2)
VLDLC SERPL-MCNC: 15 MG/DL (ref 5–40)
WBC # BLD AUTO: 6.72 10*3/MM3 (ref 3.4–10.8)

## 2020-11-16 PROCEDURE — 84443 ASSAY THYROID STIM HORMONE: CPT

## 2020-11-16 PROCEDURE — 85025 COMPLETE CBC W/AUTO DIFF WBC: CPT

## 2020-11-16 PROCEDURE — 80053 COMPREHEN METABOLIC PANEL: CPT

## 2020-11-16 PROCEDURE — 82306 VITAMIN D 25 HYDROXY: CPT

## 2020-11-16 PROCEDURE — 82607 VITAMIN B-12: CPT

## 2020-11-16 PROCEDURE — 99213 OFFICE O/P EST LOW 20 MIN: CPT | Performed by: FAMILY MEDICINE

## 2020-11-16 PROCEDURE — 80061 LIPID PANEL: CPT

## 2020-11-16 PROCEDURE — 83036 HEMOGLOBIN GLYCOSYLATED A1C: CPT | Performed by: FAMILY MEDICINE

## 2020-11-16 RX ORDER — LORATADINE 10 MG/1
10 CAPSULE, LIQUID FILLED ORAL DAILY
COMMUNITY

## 2020-11-16 RX ORDER — ALBUTEROL SULFATE 90 UG/1
1 AEROSOL, METERED RESPIRATORY (INHALATION) AS NEEDED
Qty: 18 G | Refills: 1 | Status: SHIPPED | OUTPATIENT
Start: 2020-11-16 | End: 2022-08-25 | Stop reason: SDUPTHER

## 2020-11-17 LAB
25(OH)D3 SERPL-MCNC: 35.6 NG/ML (ref 30–100)
VIT B12 BLD-MCNC: 1052 PG/ML (ref 211–946)

## 2020-11-30 ENCOUNTER — TELEPHONE (OUTPATIENT)
Dept: FAMILY MEDICINE CLINIC | Facility: CLINIC | Age: 85
End: 2020-11-30

## 2020-12-09 NOTE — PROGRESS NOTES
Subjective   Kasey Bass is a 89 y.o. female.     Chief Complaint   Patient presents with   • Follow-up   • Diabetes       History of Present Illness     Kasey Bass presents today for   Chief Complaint   Patient presents with   • Follow-up   • Diabetes     she is in need of chronic disease followup. she denies acute complaints today.    This patient is accompanied by their self who contributes to the history of their care.    The following portions of the patient's history were reviewed and updated as appropriate: allergies, current medications, past family history, past medical history, past social history, past surgical history and problem list.    Active Ambulatory Problems     Diagnosis Date Noted   • Atopic rhinitis 06/16/2016   • Anxiety disorder 06/16/2016   • Atherosclerosis of coronary artery 06/16/2016   • Asthma 06/16/2016   • Constipation 06/16/2016   • Gastroparesis 06/16/2016   • Gastritis 06/16/2016   • Hearing impairment 06/16/2016   • Hyperlipidemia 06/16/2016   • Hypertension 06/16/2016   • Low back pain 06/16/2016   • Tinnitus 06/16/2016   • Type 2 diabetes mellitus (CMS/[facility]) 06/16/2016   • Vitamin B deficiency 06/16/2016   • Vitamin D deficiency 06/16/2016   • Insomnia 06/24/2016   • Preventative health care 09/30/2016   • Depression 06/29/2017     Resolved Ambulatory Problems     Diagnosis Date Noted   • Acute bronchitis 06/16/2016   • Acute upper respiratory infection 06/16/2016   • Chest pain 06/16/2016   • Pain in joint 06/16/2016   • Candidiasis of skin 06/16/2016   • Hypoxia 06/24/2016   • DM II (diabetes mellitus, type II), controlled (CMS/[facility])    • Chest pain    • Abdominal pain 06/29/2017   • Viral bronchitis 02/27/2018   • Chest pain 02/27/2018     Past Medical History:   Diagnosis Date   • Allergic rhinitis Lifelong   • Anaphylactic reaction Remote   • CAD (coronary artery disease) 2004   • Chronic constipation 2014   • Fracture of lumbar spine (CMS/[facility]) 1986   • GERD (gastroesophageal  reflux disease)    • Gluten-sensitive enteropathy    • HLD (hyperlipidemia) Adulthood   • Labyrinthitis    • Lumbar scoliosis    • Scoliosis Adulthood     Past Surgical History:   Procedure Laterality Date   • APPENDECTOMY     • CATARACT EXTRACTION Bilateral 2009   • CHOLECYSTECTOMY     • CORONARY ANGIOPLASTY      Small branch disease only     Family History   Problem Relation Age of Onset   • Diabetes Mother    • Heart disease Mother    • Stomach cancer Father    • Kidney cancer Sister    • Heart disease Son          age 50   • Cancer Maternal Grandmother    • Cancer Paternal Grandfather      Social History     Socioeconomic History   • Marital status:      Spouse name: Not on file   • Number of children: Not on file   • Years of education: Not on file   • Highest education level: Not on file   Tobacco Use   • Smoking status: Former Smoker     Packs/day: 0.25     Years: 22.00     Pack years: 5.50     Types: Cigarettes     Start date:      Quit date:      Years since quittin.9   • Smokeless tobacco: Never Used   Substance and Sexual Activity   • Alcohol use: No   • Drug use: No   Social History Narrative    Domestic life   lives in private home with grown daughter.    2017.          Gnosticism   [place]        Sleep hygiene  in bed 10 PM to 8 AM for 6 hours nightly often broken        Caffeine use   1 cup of coffee daily        Exercise habits  walks daily as tolerated.  Stretches every morning.        Diet   low-salt diabetic diet.        Occupation   homemaker        Hearing  moderate impairment - improves with hearing aids.          Vision  corrects with bifocal glasses.        Dental : Upper dentures        Driving   local traffic daytime in good weather       Review of Systems  Review of Systems -  General ROS: negative for - chills, fever or night sweats  Cardiovascular ROS: no chest pain or dyspnea on exertion  Gastrointestinal ROS: no abdominal  "pain, change in bowel habits, or black or bloody stools  Genito-Urinary ROS: no trouble voiding or gross hematuria    Objective   Blood pressure 122/64, pulse 74, temperature 97.8 °F (36.6 °C), resp. rate 16, height 157.5 cm (62.01\"), weight 59.8 kg (131 lb 12.8 oz), SpO2 96 %, not currently breastfeeding.  Nursing note reviewed  Physical Exam  Const: NAD, A&Ox4, Pleasant, Cooperative  Eyes: EOMI, no conjunctivitis  ENT: No nasal discharge present, neck supple  Cardiac: Regular rate and rhythm, no cyanosis  Resp: Respiratory rate within normal limits, no increased work of breathing, no audible wheezing or retractions noted  GI: No distention or ascites  Procedures  Assessment/Plan     Problem List Items Addressed This Visit        Cardiovascular and Mediastinum    Hyperlipidemia    Relevant Orders    Lipid Panel (Completed)    Hypertension    Relevant Orders    Urinalysis With Microscopic If Indicated (No Culture) - Urine, Clean Catch       Digestive    Vitamin B deficiency    Relevant Orders    CBC & Differential (Completed)    Vitamin B12 (Completed)    Vitamin D deficiency    Relevant Orders    Vitamin D 25 Hydroxy (Completed)       Endocrine    Type 2 diabetes mellitus (CMS/HCC) - Primary    Relevant Orders    POC Glycosylated Hemoglobin (Hb A1C) (Completed)    Comprehensive Metabolic Panel (Completed)    TSH Rfx On Abnormal To Free T4 (Completed)    Urinalysis With Microscopic If Indicated (No Culture) - Urine, Clean Catch        See patient diagnoses and orders along with patient instructions for assessment, plan, and changes to care for patient.    There are no Patient Instructions on file for this visit.    No follow-ups on file.    Ambulatory progress note signed and attested to by Andrew Bethea D.O.         "

## 2021-01-18 DIAGNOSIS — F41.1 GENERALIZED ANXIETY DISORDER: ICD-10-CM

## 2021-01-18 DIAGNOSIS — F51.01 PRIMARY INSOMNIA: ICD-10-CM

## 2021-01-18 RX ORDER — DIAZEPAM 5 MG/1
5 TABLET ORAL NIGHTLY PRN
Qty: 30 TABLET | Refills: 2 | Status: SHIPPED | OUTPATIENT
Start: 2021-01-18 | End: 2021-04-06 | Stop reason: SDUPTHER

## 2021-04-05 DIAGNOSIS — F41.1 GENERALIZED ANXIETY DISORDER: ICD-10-CM

## 2021-04-05 DIAGNOSIS — F51.01 PRIMARY INSOMNIA: ICD-10-CM

## 2021-04-05 RX ORDER — DIAZEPAM 5 MG/1
5 TABLET ORAL NIGHTLY PRN
Qty: 30 TABLET | Refills: 2 | Status: CANCELLED | OUTPATIENT
Start: 2021-04-05

## 2021-04-05 NOTE — TELEPHONE ENCOUNTER
Caller: Kasey Bass    Relationship: Self    Best call back number: [phone]    Medication needed:   Requested Prescriptions     Pending Prescriptions Disp Refills   • diazePAM (VALIUM) 5 MG tablet 30 tablet 2     Sig: Take 1 tablet by mouth At Night As Needed for Sleep.       When do you need the refill by: 04/05/2021    What additional details did the patient provide when requesting the medication: PATIENT HAS 3 TABLETS LEFT   PATIENT WOULD LIKE TO BE NOTIFIED   Does the patient have less than a 3 day supply:  [x] Yes  [] No    What is the patient's preferred pharmacy: [facility] - [address] - [phone]  - [phone] FX

## 2021-04-06 RX ORDER — DIAZEPAM 5 MG/1
5 TABLET ORAL NIGHTLY PRN
Qty: 30 TABLET | Refills: 5 | Status: SHIPPED | OUTPATIENT
Start: 2021-04-06 | End: 2021-09-14 | Stop reason: SDUPTHER

## 2021-04-06 NOTE — TELEPHONE ENCOUNTER
Last fill: 1/18/21  Last office visit: 11/16/20  Next office visit: 4/7/21  CSA up-to-date? no  Date of last UDS: none  UDS consistent: n/a    Contacted patient daughter and advised her that the patient will need to be evaluated for further refills. She verbalized understanding and agreed

## 2021-09-14 ENCOUNTER — OFFICE VISIT (OUTPATIENT)
Dept: FAMILY MEDICINE CLINIC | Facility: CLINIC | Age: 86
End: 2021-09-14

## 2021-09-14 ENCOUNTER — APPOINTMENT (OUTPATIENT)
Dept: GENERAL RADIOLOGY | Facility: HOSPITAL | Age: 86
End: 2021-09-14

## 2021-09-14 ENCOUNTER — LAB (OUTPATIENT)
Dept: LAB | Facility: HOSPITAL | Age: 86
End: 2021-09-14

## 2021-09-14 ENCOUNTER — HOSPITAL ENCOUNTER (EMERGENCY)
Facility: HOSPITAL | Age: 86
Discharge: HOME | End: 2021-09-14
Attending: EMERGENCY MEDICINE | Admitting: EMERGENCY MEDICINE

## 2021-09-14 VITALS
OXYGEN SATURATION: 98 % | SYSTOLIC BLOOD PRESSURE: 142 MMHG | BODY MASS INDEX: 21.05 KG/M2 | WEIGHT: 131 LBS | RESPIRATION RATE: 18 BRPM | DIASTOLIC BLOOD PRESSURE: 90 MMHG | HEART RATE: 64 BPM | TEMPERATURE: 97.9 F | HEIGHT: 66 IN

## 2021-09-14 VITALS
RESPIRATION RATE: 16 BRPM | HEIGHT: 62 IN | TEMPERATURE: 97.3 F | WEIGHT: 131 LBS | DIASTOLIC BLOOD PRESSURE: 82 MMHG | HEART RATE: 86 BPM | SYSTOLIC BLOOD PRESSURE: 140 MMHG | OXYGEN SATURATION: 96 % | BODY MASS INDEX: 24.11 KG/M2

## 2021-09-14 DIAGNOSIS — R55 SYNCOPE AND COLLAPSE: ICD-10-CM

## 2021-09-14 DIAGNOSIS — E83.52 HYPERCALCEMIA: ICD-10-CM

## 2021-09-14 DIAGNOSIS — E11.9 TYPE 2 DIABETES MELLITUS WITHOUT COMPLICATION, WITHOUT LONG-TERM CURRENT USE OF INSULIN (HCC): ICD-10-CM

## 2021-09-14 DIAGNOSIS — F41.1 GENERALIZED ANXIETY DISORDER: ICD-10-CM

## 2021-09-14 DIAGNOSIS — Z00.00 MEDICARE ANNUAL WELLNESS VISIT, SUBSEQUENT: Primary | ICD-10-CM

## 2021-09-14 DIAGNOSIS — M54.9 MID BACK PAIN ON LEFT SIDE: ICD-10-CM

## 2021-09-14 DIAGNOSIS — E78.2 MIXED HYPERLIPIDEMIA: ICD-10-CM

## 2021-09-14 DIAGNOSIS — R73.9 ELEVATED BLOOD SUGAR: ICD-10-CM

## 2021-09-14 DIAGNOSIS — Z13.0 SCREENING FOR DEFICIENCY ANEMIA: ICD-10-CM

## 2021-09-14 DIAGNOSIS — E55.9 VITAMIN D DEFICIENCY: ICD-10-CM

## 2021-09-14 DIAGNOSIS — F51.01 PRIMARY INSOMNIA: ICD-10-CM

## 2021-09-14 DIAGNOSIS — Z13.29 SCREENING FOR ENDOCRINE DISORDER: ICD-10-CM

## 2021-09-14 DIAGNOSIS — M25.512 ACUTE PAIN OF LEFT SHOULDER: Primary | ICD-10-CM

## 2021-09-14 DIAGNOSIS — I10 ESSENTIAL HYPERTENSION: ICD-10-CM

## 2021-09-14 LAB
25(OH)D3 SERPL-MCNC: 46.3 NG/ML
ALBUMIN SERPL-MCNC: 3.7 G/DL (ref 3.5–5.2)
ALBUMIN/GLOB SERPL: 1.8 G/DL
ALP SERPL-CCNC: 65 U/L (ref 39–117)
ALT SERPL W P-5'-P-CCNC: 10 U/L (ref 1–33)
ANION GAP SERPL CALCULATED.3IONS-SCNC: 13 MMOL/L (ref 5–15)
AST SERPL-CCNC: 17 U/L (ref 1–32)
BACTERIA UR QL AUTO: ABNORMAL /HPF
BASOPHILS # BLD AUTO: 0.05 10*3/MM3 (ref 0–0.2)
BASOPHILS NFR BLD AUTO: 0.8 % (ref 0–1.5)
BILIRUB SERPL-MCNC: 0.3 MG/DL (ref 0–1.2)
BILIRUB UR QL STRIP: NEGATIVE
BILIRUB UR QL STRIP: NEGATIVE
BUN SERPL-MCNC: 15 MG/DL (ref 8–23)
BUN/CREAT SERPL: 12.7 (ref 7–25)
CA-I BLD-MCNC: 5.3 MG/DL (ref 4.6–5.4)
CA-I SERPL ISE-MCNC: 1.33 MMOL/L (ref 1.15–1.35)
CALCIUM SPEC-SCNC: 9.1 MG/DL (ref 8.2–9.6)
CHLORIDE SERPL-SCNC: 104 MMOL/L (ref 98–107)
CLARITY UR: CLEAR
CLARITY UR: CLEAR
CO2 SERPL-SCNC: 22 MMOL/L (ref 22–29)
COLOR UR: YELLOW
COLOR UR: YELLOW
CREAT SERPL-MCNC: 1.18 MG/DL (ref 0.57–1)
DEPRECATED RDW RBC AUTO: 40.6 FL (ref 37–54)
DEPRECATED RDW RBC AUTO: 42 FL (ref 37–54)
EOSINOPHIL # BLD AUTO: 0.07 10*3/MM3 (ref 0–0.4)
EOSINOPHIL NFR BLD AUTO: 1.2 % (ref 0.3–6.2)
ERYTHROCYTE [DISTWIDTH] IN BLOOD BY AUTOMATED COUNT: 13 % (ref 12.3–15.4)
ERYTHROCYTE [DISTWIDTH] IN BLOOD BY AUTOMATED COUNT: 13.1 % (ref 12.3–15.4)
EXPIRATION DATE: NORMAL
GFR SERPL CREATININE-BSD FRML MDRD: 43 ML/MIN/1.73
GLOBULIN UR ELPH-MCNC: 2.1 GM/DL
GLUCOSE SERPL-MCNC: 346 MG/DL (ref 65–99)
GLUCOSE UR STRIP-MCNC: ABNORMAL MG/DL
GLUCOSE UR STRIP-MCNC: ABNORMAL MG/DL
HBA1C MFR BLD: 6.7 %
HCT VFR BLD AUTO: 38.9 % (ref 34–46.6)
HCT VFR BLD AUTO: 43.2 % (ref 34–46.6)
HGB BLD-MCNC: 13.1 G/DL (ref 12–15.9)
HGB BLD-MCNC: 14.9 G/DL (ref 12–15.9)
HGB UR QL STRIP.AUTO: NEGATIVE
HGB UR QL STRIP.AUTO: NEGATIVE
HOLD SPECIMEN: NORMAL
HOLD SPECIMEN: NORMAL
HYALINE CASTS UR QL AUTO: ABNORMAL /LPF
IMM GRANULOCYTES # BLD AUTO: 0.01 10*3/MM3 (ref 0–0.05)
IMM GRANULOCYTES NFR BLD AUTO: 0.2 % (ref 0–0.5)
KETONES UR QL STRIP: ABNORMAL
KETONES UR QL STRIP: NEGATIVE
LEUKOCYTE ESTERASE UR QL STRIP.AUTO: ABNORMAL
LEUKOCYTE ESTERASE UR QL STRIP.AUTO: NEGATIVE
LIPASE SERPL-CCNC: 62 U/L (ref 13–60)
LYMPHOCYTES # BLD AUTO: 1.17 10*3/MM3 (ref 0.7–3.1)
LYMPHOCYTES NFR BLD AUTO: 19.4 % (ref 19.6–45.3)
Lab: NORMAL
MCH RBC QN AUTO: 29.5 PG (ref 26.6–33)
MCH RBC QN AUTO: 29.6 PG (ref 26.6–33)
MCHC RBC AUTO-ENTMCNC: 33.7 G/DL (ref 31.5–35.7)
MCHC RBC AUTO-ENTMCNC: 34.5 G/DL (ref 31.5–35.7)
MCV RBC AUTO: 85.5 FL (ref 79–97)
MCV RBC AUTO: 88 FL (ref 79–97)
MONOCYTES # BLD AUTO: 0.46 10*3/MM3 (ref 0.1–0.9)
MONOCYTES NFR BLD AUTO: 7.6 % (ref 5–12)
NEUTROPHILS NFR BLD AUTO: 4.28 10*3/MM3 (ref 1.7–7)
NEUTROPHILS NFR BLD AUTO: 70.8 % (ref 42.7–76)
NITRITE UR QL STRIP: NEGATIVE
NITRITE UR QL STRIP: NEGATIVE
NRBC BLD AUTO-RTO: 0 /100 WBC (ref 0–0.2)
NT-PROBNP SERPL-MCNC: 287 PG/ML (ref 0–1800)
PH UR STRIP.AUTO: 5.5 [PH] (ref 5–8)
PH UR STRIP.AUTO: 6 [PH] (ref 5–8)
PLATELET # BLD AUTO: 148 10*3/MM3 (ref 140–450)
PLATELET # BLD AUTO: 186 10*3/MM3 (ref 140–450)
PMV BLD AUTO: 9.5 FL (ref 6–12)
PMV BLD AUTO: 9.7 FL (ref 6–12)
POTASSIUM SERPL-SCNC: 4 MMOL/L (ref 3.5–5.2)
PROT SERPL-MCNC: 5.8 G/DL (ref 6–8.5)
PROT UR QL STRIP: NEGATIVE
PROT UR QL STRIP: NEGATIVE
PTH-INTACT SERPL-MCNC: 50.5 PG/ML (ref 15–65)
RBC # BLD AUTO: 4.42 10*6/MM3 (ref 3.77–5.28)
RBC # BLD AUTO: 5.05 10*6/MM3 (ref 3.77–5.28)
RBC # UR: ABNORMAL /HPF
REF LAB TEST METHOD: ABNORMAL
SODIUM SERPL-SCNC: 139 MMOL/L (ref 136–145)
SP GR UR STRIP: 1.01 (ref 1–1.03)
SP GR UR STRIP: 1.03 (ref 1–1.03)
SQUAMOUS #/AREA URNS HPF: ABNORMAL /HPF
TROPONIN T SERPL-MCNC: <0.01 NG/ML (ref 0–0.03)
TROPONIN T SERPL-MCNC: <0.01 NG/ML (ref 0–0.03)
UROBILINOGEN UR QL STRIP: ABNORMAL
UROBILINOGEN UR QL STRIP: ABNORMAL
WBC # BLD AUTO: 6.04 10*3/MM3 (ref 3.4–10.8)
WBC # BLD AUTO: 6.82 10*3/MM3 (ref 3.4–10.8)
WBC UR QL AUTO: ABNORMAL /HPF
WHOLE BLOOD HOLD SPECIMEN: NORMAL
WHOLE BLOOD HOLD SPECIMEN: NORMAL

## 2021-09-14 PROCEDURE — 82306 VITAMIN D 25 HYDROXY: CPT

## 2021-09-14 PROCEDURE — 93005 ELECTROCARDIOGRAM TRACING: CPT

## 2021-09-14 PROCEDURE — 82330 ASSAY OF CALCIUM: CPT

## 2021-09-14 PROCEDURE — 83735 ASSAY OF MAGNESIUM: CPT

## 2021-09-14 PROCEDURE — 84484 ASSAY OF TROPONIN QUANT: CPT | Performed by: EMERGENCY MEDICINE

## 2021-09-14 PROCEDURE — 85025 COMPLETE CBC W/AUTO DIFF WBC: CPT | Performed by: EMERGENCY MEDICINE

## 2021-09-14 PROCEDURE — 84100 ASSAY OF PHOSPHORUS: CPT

## 2021-09-14 PROCEDURE — 83880 ASSAY OF NATRIURETIC PEPTIDE: CPT | Performed by: EMERGENCY MEDICINE

## 2021-09-14 PROCEDURE — 71045 X-RAY EXAM CHEST 1 VIEW: CPT

## 2021-09-14 PROCEDURE — 80053 COMPREHEN METABOLIC PANEL: CPT

## 2021-09-14 PROCEDURE — 99284 EMERGENCY DEPT VISIT MOD MDM: CPT

## 2021-09-14 PROCEDURE — 85027 COMPLETE CBC AUTOMATED: CPT

## 2021-09-14 PROCEDURE — 83690 ASSAY OF LIPASE: CPT | Performed by: EMERGENCY MEDICINE

## 2021-09-14 PROCEDURE — 84443 ASSAY THYROID STIM HORMONE: CPT

## 2021-09-14 PROCEDURE — 83970 ASSAY OF PARATHORMONE: CPT

## 2021-09-14 PROCEDURE — 81001 URINALYSIS AUTO W/SCOPE: CPT

## 2021-09-14 PROCEDURE — 81003 URINALYSIS AUTO W/O SCOPE: CPT | Performed by: EMERGENCY MEDICINE

## 2021-09-14 PROCEDURE — 99213 OFFICE O/P EST LOW 20 MIN: CPT | Performed by: FAMILY MEDICINE

## 2021-09-14 PROCEDURE — G0439 PPPS, SUBSEQ VISIT: HCPCS | Performed by: FAMILY MEDICINE

## 2021-09-14 PROCEDURE — 80061 LIPID PANEL: CPT

## 2021-09-14 PROCEDURE — 80053 COMPREHEN METABOLIC PANEL: CPT | Performed by: EMERGENCY MEDICINE

## 2021-09-14 PROCEDURE — 93005 ELECTROCARDIOGRAM TRACING: CPT | Performed by: EMERGENCY MEDICINE

## 2021-09-14 RX ORDER — ACETAMINOPHEN 325 MG/1
650 TABLET ORAL ONCE
Status: COMPLETED | OUTPATIENT
Start: 2021-09-14 | End: 2021-09-14

## 2021-09-14 RX ORDER — SODIUM CHLORIDE 0.9 % (FLUSH) 0.9 %
10 SYRINGE (ML) INJECTION AS NEEDED
Status: DISCONTINUED | OUTPATIENT
Start: 2021-09-14 | End: 2021-09-15 | Stop reason: HOSPADM

## 2021-09-14 RX ORDER — LIDOCAINE 50 MG/G
1 PATCH TOPICAL EVERY 24 HOURS
Qty: 30 PATCH | Refills: 11 | Status: SHIPPED | OUTPATIENT
Start: 2021-09-14 | End: 2023-02-24

## 2021-09-14 RX ORDER — DIAZEPAM 5 MG/1
5 TABLET ORAL NIGHTLY PRN
Qty: 30 TABLET | Refills: 5 | Status: SHIPPED | OUTPATIENT
Start: 2021-09-14 | End: 2022-03-28 | Stop reason: SDUPTHER

## 2021-09-14 RX ADMIN — ACETAMINOPHEN 650 MG: 325 TABLET, FILM COATED ORAL at 21:37
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
## 2021-09-15 ENCOUNTER — TELEPHONE (OUTPATIENT)
Dept: FAMILY MEDICINE CLINIC | Facility: CLINIC | Age: 86
End: 2021-09-15

## 2021-09-15 LAB
ALBUMIN SERPL-MCNC: 4.5 G/DL (ref 3.5–5.2)
ALBUMIN/GLOB SERPL: 1.7 G/DL
ALP SERPL-CCNC: 68 U/L (ref 39–117)
ALT SERPL W P-5'-P-CCNC: 8 U/L (ref 1–33)
ANION GAP SERPL CALCULATED.3IONS-SCNC: 12.1 MMOL/L (ref 5–15)
AST SERPL-CCNC: 16 U/L (ref 1–32)
BILIRUB SERPL-MCNC: 0.4 MG/DL (ref 0–1.2)
BUN SERPL-MCNC: 13 MG/DL (ref 8–23)
BUN/CREAT SERPL: 14 (ref 7–25)
CALCIUM SPEC-SCNC: 9.8 MG/DL (ref 8.2–9.6)
CHLORIDE SERPL-SCNC: 103 MMOL/L (ref 98–107)
CHOLEST SERPL-MCNC: 233 MG/DL (ref 0–200)
CO2 SERPL-SCNC: 23.9 MMOL/L (ref 22–29)
CREAT SERPL-MCNC: 0.93 MG/DL (ref 0.57–1)
GFR SERPL CREATININE-BSD FRML MDRD: 56 ML/MIN/1.73
GLOBULIN UR ELPH-MCNC: 2.6 GM/DL
GLUCOSE SERPL-MCNC: 119 MG/DL (ref 65–99)
HDLC SERPL-MCNC: 72 MG/DL (ref 40–60)
HOLD SPECIMEN: NORMAL
LDLC SERPL CALC-MCNC: 146 MG/DL (ref 0–100)
LDLC/HDLC SERPL: 1.99 {RATIO}
MAGNESIUM SERPL-MCNC: 2.3 MG/DL (ref 1.7–2.3)
PHOSPHATE SERPL-MCNC: 3.3 MG/DL (ref 2.5–4.5)
POTASSIUM SERPL-SCNC: 4.2 MMOL/L (ref 3.5–5.2)
PROT SERPL-MCNC: 7.1 G/DL (ref 6–8.5)
SODIUM SERPL-SCNC: 139 MMOL/L (ref 136–145)
TRIGL SERPL-MCNC: 89 MG/DL (ref 0–150)
TSH SERPL DL<=0.05 MIU/L-ACNC: 2.47 UIU/ML (ref 0.27–4.2)
VLDLC SERPL-MCNC: 15 MG/DL (ref 5–40)
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
## 2021-09-18 LAB
QT INTERVAL: 408 MS
QT INTERVAL: 410 MS
QTC INTERVAL: 433 MS
QTC INTERVAL: 443 MS

## 2021-09-23 ENCOUNTER — PRIOR AUTHORIZATION (OUTPATIENT)
Dept: FAMILY MEDICINE CLINIC | Facility: CLINIC | Age: 86
End: 2021-09-23

## 2021-09-23 NOTE — TELEPHONE ENCOUNTER
Called pt's pharmacy to inquire if pt needed PA or used Good RX card for Lidocaine 5%patches Was told pt didn't use card and hasnt picked up medication yet. Only noted Medicare A&B in insurance. Was given Medicare part D info.   RX ID: [id number]  RXBIN:[id number]   RXPCN:[id number]  RX GROUP:   Prior Authorization for Lidocaine Patches 5% attempted However error msg received that member could not be found. Called [employer] Medicare part D; Unable to find pt. Attempted to contact pt or pt's emergency contact. No answer LVM with office number given.

## 2021-09-24 ENCOUNTER — TELEPHONE (OUTPATIENT)
Dept: CARDIOLOGY | Facility: HOSPITAL | Age: 86
End: 2021-09-24

## 2021-09-24 ENCOUNTER — PATIENT OUTREACH (OUTPATIENT)
Dept: CASE MANAGEMENT | Facility: OTHER | Age: 86
End: 2021-09-24

## 2021-09-24 NOTE — OUTREACH NOTE
Ambulatory Case Management Note    Care Coordination    Unable to reach patient after 4 different attempts on different days, following recent ED visit 9-14-21 for acute pain left shoulder, left neck; Syncope & Collapse; Elevated BS.  Called both numbers listed for patient and left voicemail each time with RN-ACM contact information, if needed.   This note will be routed to the PCP.       Marjorie Lopez RN  Ambulatory Case Management    9/24/2021, 14:57 EDT

## 2021-09-24 NOTE — TELEPHONE ENCOUNTER
Patient was referred to [facility] by the [facility] ER. Several attempts have been made to contact the patient with no success. Letter was mailed to patient to contact the office to schedule.

## 2021-10-08 ENCOUNTER — TELEPHONE (OUTPATIENT)
Dept: FAMILY MEDICINE CLINIC | Facility: CLINIC | Age: 86
End: 2021-10-08

## 2021-10-08 NOTE — TELEPHONE ENCOUNTER
Caller: Kasey Bass    Relationship: Self      Medication requested (name and dosage): diazePAM (VALIUM) 5 MG tablet    Pharmacy where request should be sent: [facility] [address] [place] [address] [phone]    Additional details provided by patient: patient has 2 days left    Best call back number: [phone]    Does the patient have less than a 3 day supply:  [x] Yes  [] No    Lory Grey Rep   10/08/21 09:53 EDT

## 2022-01-10 ENCOUNTER — OFFICE VISIT (OUTPATIENT)
Dept: FAMILY MEDICINE CLINIC | Facility: CLINIC | Age: 87
End: 2022-01-10

## 2022-01-10 VITALS
BODY MASS INDEX: 21.83 KG/M2 | HEART RATE: 71 BPM | WEIGHT: 135.8 LBS | DIASTOLIC BLOOD PRESSURE: 70 MMHG | RESPIRATION RATE: 16 BRPM | HEIGHT: 66 IN | OXYGEN SATURATION: 96 % | SYSTOLIC BLOOD PRESSURE: 120 MMHG

## 2022-01-10 DIAGNOSIS — M65.341 TRIGGER RING FINGER OF RIGHT HAND: Primary | ICD-10-CM

## 2022-01-10 PROCEDURE — 99213 OFFICE O/P EST LOW 20 MIN: CPT | Performed by: FAMILY MEDICINE

## 2022-01-10 NOTE — PATIENT INSTRUCTIONS
Trigger Finger    Trigger finger, also called stenosing tenosynovitis,  is a condition that causes a finger to get stuck in a bent position. Each finger has a tendon, which is a tough, cord-like tissue that connects muscle to bone, and each tendon passes through a tunnel of tissue called a tendon sheath.  To move your finger, your tendon needs to glide freely through the sheath. Trigger finger happens when the tendon or the sheath thickens, making it difficult to move your finger. Trigger finger can affect any finger or a thumb. It may affect more than one finger. Mild cases may clear up with rest and medicine. Severe cases require more treatment.  What are the causes?  Trigger finger is caused by a thickened finger tendon or tendon sheath. The cause of this thickening is not known.  What increases the risk?  The following factors may make you more likely to develop this condition:  · Doing activities that require a strong .  · Having rheumatoid arthritis, gout, or diabetes.  · Being 40-60 years old.  · Being female.  What are the signs or symptoms?  Symptoms of this condition include:  · Pain when bending or straightening your finger.  · Tenderness or swelling where your finger attaches to the palm of your hand.  · A lump in the palm of your hand or on the inside of your finger.  · Hearing a noise like a pop or a snap when you try to straighten your finger.  · Feeling a catching or locking sensation when you try to straighten your finger.  · Being unable to straighten your finger.  How is this diagnosed?  This condition is diagnosed based on your symptoms and a physical exam.  How is this treated?  This condition may be treated by:  · Resting your finger and avoiding activities that make symptoms worse.  · Wearing a finger splint to keep your finger extended.  · Taking NSAIDs, such as ibuprofen, to relieve pain and swelling.  · Doing gentle exercises to stretch the finger as told by your health care  provider.  · Having medicine that reduces swelling and inflammation (steroids) injected into the tendon sheath. Injections may need to be repeated.  · Having surgery to open the tendon sheath. This may be done if other treatments do not work and you cannot straighten your finger. You may need physical therapy after surgery.  Follow these instructions at home:  If you have a splint:  · Wear the splint as told by your health care provider. Remove it only as told by your health care provider.  · Loosen it if your fingers tingle, become numb, or turn cold and blue.  · Keep it clean.  · If the splint is not waterproof:  ? Do not let it get wet.  ? Cover it with a watertight covering when you take a bath or shower.  Managing pain, stiffness, and swelling         If directed, apply heat to the affected area as often as told by your health care provider. Use the heat source that your health care provider recommends, such as a moist heat pack or a heating pad.  · Place a towel between your skin and the heat source.  · Leave the heat on for 20-30 minutes.  · Remove the heat if your skin turns bright red. This is especially important if you are unable to feel pain, heat, or cold. You may have a greater risk of getting burned.  If directed, put ice on the painful area. To do this:  · If you have a removable splint, remove it as told by your health care provider.  · Put ice in a plastic bag.  · Place a towel between your skin and the bag or between your splint and the bag.  · Leave the ice on for 20 minutes, 2-3 times a day.    Activity  · Rest your finger as told by your health care provider. Avoid activities that make the pain worse.  · Return to your normal activities as told by your health care provider. Ask your health care provider what activities are safe for you.  · Do exercises as told by your health care provider.  · Ask your health care provider when it is safe to drive if you have a splint on your hand.  General  instructions  · Take over-the-counter and prescription medicines only as told by your health care provider.  · Keep all follow-up visits as told by your health care provider. This is important.  Contact a health care provider if:  · Your symptoms are not improving with home care.  Summary  · Trigger finger, also called stenosing tenosynovitis, causes your finger to get stuck in a bent position. This can make it difficult and painful to straighten your finger.  · This condition develops when a finger tendon or tendon sheath thickens.  · Treatment may include resting your finger, wearing a splint, and taking medicines.  · In severe cases, surgery to open the tendon sheath may be needed.  This information is not intended to replace advice given to you by your health care provider. Make sure you discuss any questions you have with your health care provider.  Document Revised: 05/04/2020 Document Reviewed: 05/04/2020  Elsevier Patient Education © 2021 [employer]
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
## 2022-01-31 ENCOUNTER — OFFICE VISIT (OUTPATIENT)
Dept: FAMILY MEDICINE CLINIC | Facility: CLINIC | Age: 87
End: 2022-01-31

## 2022-01-31 VITALS
DIASTOLIC BLOOD PRESSURE: 90 MMHG | SYSTOLIC BLOOD PRESSURE: 144 MMHG | OXYGEN SATURATION: 95 % | WEIGHT: 133.2 LBS | BODY MASS INDEX: 21.41 KG/M2 | RESPIRATION RATE: 16 BRPM | HEART RATE: 70 BPM | HEIGHT: 66 IN

## 2022-01-31 DIAGNOSIS — E11.9 TYPE 2 DIABETES MELLITUS WITHOUT COMPLICATION, WITHOUT LONG-TERM CURRENT USE OF INSULIN: ICD-10-CM

## 2022-01-31 DIAGNOSIS — K59.01 SLOW TRANSIT CONSTIPATION: ICD-10-CM

## 2022-01-31 DIAGNOSIS — M65.341 TRIGGER RING FINGER OF RIGHT HAND: Primary | ICD-10-CM

## 2022-01-31 LAB
EXPIRATION DATE: NORMAL
HBA1C MFR BLD: 6.8 %
Lab: NORMAL

## 2022-01-31 PROCEDURE — 3051F HG A1C>EQUAL 7.0%<8.0%: CPT | Performed by: FAMILY MEDICINE

## 2022-01-31 PROCEDURE — 99214 OFFICE O/P EST MOD 30 MIN: CPT | Performed by: FAMILY MEDICINE

## 2022-01-31 PROCEDURE — 83036 HEMOGLOBIN GLYCOSYLATED A1C: CPT | Performed by: FAMILY MEDICINE

## 2022-02-01 NOTE — ASSESSMENT & PLAN NOTE
A lifelong, she has responded well to Linzess in the past. She is currently responding to MiraLAX, fiber, milk of magnesia, and docusate. A new prescription for Linzess is provided for patient today. Samples were not available at the time of patient appointment.
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
## 2022-03-28 DIAGNOSIS — F41.1 GENERALIZED ANXIETY DISORDER: ICD-10-CM

## 2022-03-28 DIAGNOSIS — F51.01 PRIMARY INSOMNIA: ICD-10-CM

## 2022-03-28 RX ORDER — DIAZEPAM 5 MG/1
5 TABLET ORAL NIGHTLY PRN
Qty: 30 TABLET | Refills: 5 | Status: SHIPPED | OUTPATIENT
Start: 2022-03-28 | End: 2022-09-19 | Stop reason: SDUPTHER

## 2022-03-28 NOTE — TELEPHONE ENCOUNTER
Caller: Kasey Bass    Relationship: Self    Best call back number: [phone]    Requested Prescriptions:   Requested Prescriptions     Pending Prescriptions Disp Refills   • diazePAM (VALIUM) 5 MG tablet 30 tablet 5     Sig: Take 1 tablet by mouth At Night As Needed for Sleep.        Pharmacy where request should be sent: [facility] [address] - [phone]  - [phone] FX     Additional details provided by patient: PATIENT NEEDS A REFILL, OR AT LEAST A PARTIAL UNTIL APPOINTMENT ON 04/01/22. PLEASE ADVISE      Does the patient have less than a 3 day supply:  [x] Yes  [] No    Lory Obrien Rep   03/28/22 11:53 EDT

## 2022-03-28 NOTE — TELEPHONE ENCOUNTER
Rx Refill Note  Requested Prescriptions     Pending Prescriptions Disp Refills   • diazePAM (VALIUM) 5 MG tablet 30 tablet 5     Sig: Take 1 tablet by mouth At Night As Needed for Sleep.      Last office visit with prescribing clinician: 1/31/2022      Next office visit with prescribing clinician: 4/1/2022     My Palomo MA  03/28/22, 12:03 EDT     Last fill: 09/14/2021  CSA:Not up to date  UDS:Not up to date

## 2022-08-25 ENCOUNTER — LAB (OUTPATIENT)
Dept: LAB | Facility: HOSPITAL | Age: 87
End: 2022-08-25

## 2022-08-25 ENCOUNTER — OFFICE VISIT (OUTPATIENT)
Dept: FAMILY MEDICINE CLINIC | Facility: CLINIC | Age: 87
End: 2022-08-25

## 2022-08-25 VITALS
OXYGEN SATURATION: 95 % | HEIGHT: 66 IN | DIASTOLIC BLOOD PRESSURE: 80 MMHG | SYSTOLIC BLOOD PRESSURE: 126 MMHG | HEART RATE: 63 BPM | BODY MASS INDEX: 21.51 KG/M2

## 2022-08-25 DIAGNOSIS — I10 PRIMARY HYPERTENSION: ICD-10-CM

## 2022-08-25 DIAGNOSIS — R29.898 WEAKNESS OF LEFT LEG: ICD-10-CM

## 2022-08-25 DIAGNOSIS — E11.9 TYPE 2 DIABETES MELLITUS WITHOUT COMPLICATION, WITHOUT LONG-TERM CURRENT USE OF INSULIN: ICD-10-CM

## 2022-08-25 DIAGNOSIS — R55 SYNCOPE, UNSPECIFIED SYNCOPE TYPE: Primary | ICD-10-CM

## 2022-08-25 DIAGNOSIS — R53.81 DEBILITY: ICD-10-CM

## 2022-08-25 LAB
ANION GAP SERPL CALCULATED.3IONS-SCNC: 11 MMOL/L (ref 5–15)
BUN SERPL-MCNC: 13 MG/DL (ref 8–23)
BUN/CREAT SERPL: 15.1 (ref 7–25)
CALCIUM SPEC-SCNC: 9.5 MG/DL (ref 8.2–9.6)
CHLORIDE SERPL-SCNC: 104 MMOL/L (ref 98–107)
CO2 SERPL-SCNC: 26 MMOL/L (ref 22–29)
CREAT SERPL-MCNC: 0.86 MG/DL (ref 0.57–1)
EGFRCR SERPLBLD CKD-EPI 2021: 61.9 ML/MIN/1.73
GLUCOSE SERPL-MCNC: 137 MG/DL (ref 65–99)
POTASSIUM SERPL-SCNC: 3.6 MMOL/L (ref 3.5–5.2)
SODIUM SERPL-SCNC: 141 MMOL/L (ref 136–145)

## 2022-08-25 PROCEDURE — 80048 BASIC METABOLIC PNL TOTAL CA: CPT

## 2022-08-25 PROCEDURE — 99214 OFFICE O/P EST MOD 30 MIN: CPT | Performed by: FAMILY MEDICINE

## 2022-08-25 RX ORDER — ALBUTEROL SULFATE 90 UG/1
1 AEROSOL, METERED RESPIRATORY (INHALATION) AS NEEDED
Qty: 18 G | Refills: 1 | Status: SHIPPED | OUTPATIENT
Start: 2022-08-25

## 2022-08-25 RX ORDER — NITROGLYCERIN 0.4 MG/1
0.4 TABLET SUBLINGUAL
Qty: 30 TABLET | Refills: 3 | Status: SHIPPED | OUTPATIENT
Start: 2022-08-25

## 2022-08-25 RX ORDER — METFORMIN HYDROCHLORIDE 500 MG/1
1 TABLET, EXTENDED RELEASE ORAL 2 TIMES DAILY
COMMUNITY
Start: 2022-08-22 | End: 2023-02-27

## 2022-08-25 RX ORDER — LISINOPRIL 20 MG/1
1 TABLET ORAL DAILY
COMMUNITY
Start: 2022-08-22 | End: 2022-09-19 | Stop reason: SDUPTHER
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
## 2022-08-26 ENCOUNTER — HOME HEALTH ADMISSION (OUTPATIENT)
Dept: HOME HEALTH SERVICES | Facility: HOME HEALTHCARE | Age: 87
End: 2022-08-26

## 2022-08-29 ENCOUNTER — TELEPHONE (OUTPATIENT)
Dept: FAMILY MEDICINE CLINIC | Facility: CLINIC | Age: 87
End: 2022-08-29

## 2022-09-09 ENCOUNTER — TELEMEDICINE (OUTPATIENT)
Dept: FAMILY MEDICINE CLINIC | Facility: CLINIC | Age: 87
End: 2022-09-09

## 2022-09-09 DIAGNOSIS — R55 SYNCOPE, UNSPECIFIED SYNCOPE TYPE: Primary | ICD-10-CM

## 2022-09-09 DIAGNOSIS — E11.9 TYPE 2 DIABETES MELLITUS WITHOUT COMPLICATION, WITHOUT LONG-TERM CURRENT USE OF INSULIN: ICD-10-CM

## 2022-09-09 PROCEDURE — 99213 OFFICE O/P EST LOW 20 MIN: CPT | Performed by: FAMILY MEDICINE

## 2022-09-09 RX ORDER — GLIPIZIDE 5 MG/1
5 TABLET ORAL
Qty: 30 TABLET | Refills: 2 | Status: SHIPPED | OUTPATIENT
Start: 2022-09-09 | End: 2022-12-14
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
## 2022-09-19 ENCOUNTER — TELEPHONE (OUTPATIENT)
Dept: FAMILY MEDICINE CLINIC | Facility: CLINIC | Age: 87
End: 2022-09-19

## 2022-09-19 DIAGNOSIS — F41.1 GENERALIZED ANXIETY DISORDER: ICD-10-CM

## 2022-09-19 DIAGNOSIS — F51.01 PRIMARY INSOMNIA: ICD-10-CM

## 2022-09-19 DIAGNOSIS — I10 PRIMARY HYPERTENSION: Primary | ICD-10-CM

## 2022-09-19 NOTE — TELEPHONE ENCOUNTER
Rx Refill Note  Requested Prescriptions     Pending Prescriptions Disp Refills   • lisinopril (PRINIVIL,ZESTRIL) 20 MG tablet       Sig: Take 1 tablet by mouth Daily.   • diazePAM (VALIUM) 5 MG tablet 30 tablet 5     Sig: Take 1 tablet by mouth At Night As Needed for Sleep.      Last office visit with prescribing clinician: 8/25/2022      Next office visit with prescribing clinician: Visit date not found            Tiff Moseley MA  09/19/22, 15:12 EDT

## 2022-09-19 NOTE — TELEPHONE ENCOUNTER
.    Caller: JOEL DENIS    Relationship: Emergency Contact    Best call back number: [phone]    Requested Prescriptions:   Requested Prescriptions     Pending Prescriptions Disp Refills   • lisinopril (PRINIVIL,ZESTRIL) 20 MG tablet       Sig: Take 1 tablet by mouth Daily.   • diazePAM (VALIUM) 5 MG tablet 30 tablet 5     Sig: Take 1 tablet by mouth At Night As Needed for Sleep.        Pharmacy where request should be sent: [facility] [address] - [phone] [facility] [phone] FX     Additional details provided by patient: PLEASE REACH OUT WHEN THE MEDICATION IS SENT OVER. WOULD LIKE A FOLLOW UP ON A PHYSICAL THERAPIST      Does the patient have less than a 3 day supply:  [x] Yes  [] No    Lory Maria Rep   09/19/22 13:34 EDT

## 2022-09-20 RX ORDER — LISINOPRIL 20 MG/1
20 TABLET ORAL DAILY
Qty: 90 TABLET | Refills: 2 | Status: SHIPPED | OUTPATIENT
Start: 2022-09-20 | End: 2023-02-24 | Stop reason: SDUPTHER

## 2022-09-20 RX ORDER — DIAZEPAM 5 MG/1
5 TABLET ORAL NIGHTLY PRN
Qty: 30 TABLET | Refills: 5 | Status: SHIPPED | OUTPATIENT
Start: 2022-09-20 | End: 2023-02-17

## 2022-09-20 NOTE — TELEPHONE ENCOUNTER
Contacted pt, spoke with pt's daughter (verbal release reviewed) and informed her that script was sent to the pharmacy. She appreciated the callback and did not have any additional questions at this time.

## 2022-12-13 DIAGNOSIS — E11.9 TYPE 2 DIABETES MELLITUS WITHOUT COMPLICATION, WITHOUT LONG-TERM CURRENT USE OF INSULIN: ICD-10-CM

## 2022-12-14 RX ORDER — GLIPIZIDE 5 MG/1
TABLET ORAL
Qty: 30 TABLET | Refills: 2 | Status: SHIPPED | OUTPATIENT
Start: 2022-12-14 | End: 2023-02-24 | Stop reason: SDUPTHER

## 2022-12-14 NOTE — TELEPHONE ENCOUNTER
Rx Refill Note  Requested Prescriptions     Pending Prescriptions Disp Refills   • glipizide (GLUCOTROL) 5 MG tablet [Pharmacy Med Name: GLIPIZIDE 5 MG TABLET 5 Tablet] 30 tablet 2     Sig: TAKE ONE TABLET BY MOUTH EVERY DAY BEFORE LUNCH.      Last office visit with prescribing clinician: 8/25/2022   Last telemedicine visit with prescribing clinician: 9/9/2022   Next office visit with prescribing clinician: Visit date not found                         Would you like a call back once the refill request has been completed: [] Yes [] No    If the office needs to give you a call back, can they leave a voicemail: [] Yes [] No    Marcie Herron MA  12/14/22, 09:02 EST

## 2023-02-17 DIAGNOSIS — F41.1 GENERALIZED ANXIETY DISORDER: ICD-10-CM

## 2023-02-17 DIAGNOSIS — F51.01 PRIMARY INSOMNIA: ICD-10-CM

## 2023-02-17 RX ORDER — DIAZEPAM 5 MG/1
5 TABLET ORAL NIGHTLY PRN
Qty: 30 TABLET | Refills: 0 | Status: SHIPPED | OUTPATIENT
Start: 2023-02-17 | End: 2023-02-24 | Stop reason: SDUPTHER

## 2023-02-17 NOTE — TELEPHONE ENCOUNTER
Rx Refill Note  Requested Prescriptions     Pending Prescriptions Disp Refills   • diazePAM (VALIUM) 5 MG tablet [Pharmacy Med Name: DIAZEPAM 5 MG TABS 5 Tablet] 30 tablet 5     Sig: TAKE 1 TABLET BY MOUTH AT NIGHT AS NEEDED FOR SLEEP.      Last office visit with prescribing clinician: 8/25/2022   Last telemedicine visit with prescribing clinician: 9/9/2022   Next office visit with prescribing clinician: 2/24/2023                         Would you like a call back once the refill request has been completed: [] Yes [] No    If the office needs to give you a call back, can they leave a voicemail: [] Yes [] No    Gordo Mendosa MA  02/17/23, 13:06 EST     NO CSA  NO UDS

## 2023-02-24 ENCOUNTER — OFFICE VISIT (OUTPATIENT)
Dept: FAMILY MEDICINE CLINIC | Facility: CLINIC | Age: 88
End: 2023-02-24
Payer: MEDICARE

## 2023-02-24 ENCOUNTER — LAB (OUTPATIENT)
Dept: LAB | Facility: HOSPITAL | Age: 88
End: 2023-02-24
Payer: MEDICARE

## 2023-02-24 VITALS
DIASTOLIC BLOOD PRESSURE: 62 MMHG | BODY MASS INDEX: 20.02 KG/M2 | SYSTOLIC BLOOD PRESSURE: 108 MMHG | TEMPERATURE: 96.8 F | WEIGHT: 124 LBS | OXYGEN SATURATION: 97 % | HEART RATE: 81 BPM

## 2023-02-24 DIAGNOSIS — E11.9 TYPE 2 DIABETES MELLITUS WITHOUT COMPLICATION, WITHOUT LONG-TERM CURRENT USE OF INSULIN: Primary | ICD-10-CM

## 2023-02-24 DIAGNOSIS — E55.9 VITAMIN D DEFICIENCY: ICD-10-CM

## 2023-02-24 DIAGNOSIS — Z13.220 SCREENING FOR HYPERLIPIDEMIA: ICD-10-CM

## 2023-02-24 DIAGNOSIS — Z13.29 SCREENING FOR ENDOCRINE DISORDER: ICD-10-CM

## 2023-02-24 DIAGNOSIS — Z13.0 SCREENING FOR DEFICIENCY ANEMIA: ICD-10-CM

## 2023-02-24 DIAGNOSIS — F51.01 PRIMARY INSOMNIA: ICD-10-CM

## 2023-02-24 DIAGNOSIS — F41.1 GENERALIZED ANXIETY DISORDER: ICD-10-CM

## 2023-02-24 DIAGNOSIS — I10 PRIMARY HYPERTENSION: ICD-10-CM

## 2023-02-24 LAB
ALBUMIN SERPL-MCNC: 5 G/DL (ref 3.5–5.2)
ALBUMIN/GLOB SERPL: 2.1 G/DL
ALP SERPL-CCNC: 77 U/L (ref 39–117)
ALT SERPL W P-5'-P-CCNC: 12 U/L (ref 1–33)
ANION GAP SERPL CALCULATED.3IONS-SCNC: 10.7 MMOL/L (ref 5–15)
AST SERPL-CCNC: 21 U/L (ref 1–32)
BILIRUB SERPL-MCNC: 0.4 MG/DL (ref 0–1.2)
BUN SERPL-MCNC: 17 MG/DL (ref 8–23)
BUN/CREAT SERPL: 14.3 (ref 7–25)
CALCIUM SPEC-SCNC: 10 MG/DL (ref 8.2–9.6)
CHLORIDE SERPL-SCNC: 104 MMOL/L (ref 98–107)
CHOLEST SERPL-MCNC: 261 MG/DL (ref 0–200)
CO2 SERPL-SCNC: 25.3 MMOL/L (ref 22–29)
CREAT SERPL-MCNC: 1.19 MG/DL (ref 0.57–1)
DEPRECATED RDW RBC AUTO: 40.4 FL (ref 37–54)
EGFRCR SERPLBLD CKD-EPI 2021: 41.9 ML/MIN/1.73
ERYTHROCYTE [DISTWIDTH] IN BLOOD BY AUTOMATED COUNT: 12.8 % (ref 12.3–15.4)
GLOBULIN UR ELPH-MCNC: 2.4 GM/DL
GLUCOSE SERPL-MCNC: 51 MG/DL (ref 65–99)
HCT VFR BLD AUTO: 43.5 % (ref 34–46.6)
HDLC SERPL-MCNC: 81 MG/DL (ref 40–60)
HGB BLD-MCNC: 15.2 G/DL (ref 12–15.9)
LDLC SERPL CALC-MCNC: 159 MG/DL (ref 0–100)
LDLC/HDLC SERPL: 1.93 {RATIO}
MCH RBC QN AUTO: 30 PG (ref 26.6–33)
MCHC RBC AUTO-ENTMCNC: 34.9 G/DL (ref 31.5–35.7)
MCV RBC AUTO: 85.8 FL (ref 79–97)
PLATELET # BLD AUTO: 229 10*3/MM3 (ref 140–450)
PMV BLD AUTO: 9.7 FL (ref 6–12)
POTASSIUM SERPL-SCNC: 3.8 MMOL/L (ref 3.5–5.2)
PROT SERPL-MCNC: 7.4 G/DL (ref 6–8.5)
RBC # BLD AUTO: 5.07 10*6/MM3 (ref 3.77–5.28)
SODIUM SERPL-SCNC: 140 MMOL/L (ref 136–145)
TRIGL SERPL-MCNC: 119 MG/DL (ref 0–150)
VLDLC SERPL-MCNC: 21 MG/DL (ref 5–40)
WBC NRBC COR # BLD: 8.36 10*3/MM3 (ref 3.4–10.8)

## 2023-02-24 PROCEDURE — 80061 LIPID PANEL: CPT

## 2023-02-24 PROCEDURE — 80053 COMPREHEN METABOLIC PANEL: CPT

## 2023-02-24 PROCEDURE — 82306 VITAMIN D 25 HYDROXY: CPT

## 2023-02-24 PROCEDURE — 99214 OFFICE O/P EST MOD 30 MIN: CPT | Performed by: FAMILY MEDICINE

## 2023-02-24 PROCEDURE — 85027 COMPLETE CBC AUTOMATED: CPT

## 2023-02-24 PROCEDURE — 84443 ASSAY THYROID STIM HORMONE: CPT

## 2023-02-24 RX ORDER — LISINOPRIL 20 MG/1
20 TABLET ORAL DAILY
Qty: 90 TABLET | Refills: 2 | Status: SHIPPED | OUTPATIENT
Start: 2023-02-24

## 2023-02-24 RX ORDER — DIAZEPAM 5 MG/1
5-10 TABLET ORAL NIGHTLY PRN
Qty: 45 TABLET | Refills: 5 | Status: SHIPPED | OUTPATIENT
Start: 2023-02-24

## 2023-02-24 RX ORDER — GLIPIZIDE 5 MG/1
5 TABLET ORAL
Qty: 90 TABLET | Refills: 3 | Status: SHIPPED | OUTPATIENT
Start: 2023-02-24

## 2023-02-24 NOTE — PATIENT INSTRUCTIONS
Your A1c today is down to 5.3%.  This is great, the last time we checked it was 6.9%.  Your sugar is doing fantastic.

## 2023-02-25 LAB
25(OH)D3 SERPL-MCNC: 47.4 NG/ML (ref 30–100)
TSH SERPL DL<=0.05 MIU/L-ACNC: 2.51 UIU/ML (ref 0.27–4.2)

## 2023-07-18 ENCOUNTER — TELEPHONE (OUTPATIENT)
Dept: FAMILY MEDICINE CLINIC | Facility: CLINIC | Age: 88
End: 2023-07-18
Payer: MEDICARE

## 2023-07-18 NOTE — TELEPHONE ENCOUNTER
Caller: OCTAVIO LAIRD    Relationship: Emergency Contact    Best call back number: [phone]     What is the best time to reach you: ANY TIME    Who are you requesting to speak with (clinical staff, provider,  specific staff member): CLINICAL STAFF    Do you know the name of the person who called: OCTAVIO    What was the call regarding: PATIENT'S DAUGHTER IS REQUESTING A CALL BACK AFTER HER APPOINTMENT WITH DR PFEIFFER TOMORROW 7/19/23 TO DISCUSS THE CURRENT AND DETERMINED TREATMENT PLANS AS WELL AS TO ASK CLINICAL QUESTIONS ABOUT THE BEHAVIORAL HEALTH MEDICATIONS THAT HAVE BEEN PRESCRIBED RECENTLY SUCH AS HYDROXYZINE AND ZOLOFT.     PLEASE ADVISE.

## 2023-07-19 ENCOUNTER — TELEPHONE (OUTPATIENT)
Dept: FAMILY MEDICINE CLINIC | Facility: CLINIC | Age: 88
End: 2023-07-19
Payer: MEDICARE

## 2023-07-19 NOTE — TELEPHONE ENCOUNTER
Caller: OMRA    Relationship: Home Health    Best call back number: [phone]     What is the best time to reach you: ANYTIME    Who are you requesting to speak with (clinical staff, provider,  specific staff member): JAYNE PFEIFFER, DO    Do you know the name of the person who called: OMAR FROM [facility]    What was the call regarding: OMAR [facility].CALLED TO UPDAT DR. PFEIFFER THAT THE PATIENT REFUSED HER VISIT TODAY PER AND FAMILY DUE TO PATIENT BEING UP ALL NIGHT [facility] WILL DO A RETURN VISIT ON 07/21/23    Is it okay if the provider responds through MyChart: NO

## 2023-07-19 NOTE — TELEPHONE ENCOUNTER
Iona from [facility] called to say they received a referral from [facility] for the patient to receive Home Palliative Services from them. They will be faxing over a form for Dr. Bethea to sign and will be asking for office notes as well in the form. I will keep an eye out for this in the faxqueue.

## 2023-07-20 ENCOUNTER — TRANSCRIBE ORDERS (OUTPATIENT)
Dept: FAMILY MEDICINE CLINIC | Facility: CLINIC | Age: 88
End: 2023-07-20

## 2023-07-20 ENCOUNTER — TELEMEDICINE (OUTPATIENT)
Dept: FAMILY MEDICINE CLINIC | Facility: CLINIC | Age: 88
End: 2023-07-20
Payer: MEDICARE

## 2023-07-20 DIAGNOSIS — E11.9 TYPE 2 DIABETES MELLITUS WITHOUT COMPLICATION, WITHOUT LONG-TERM CURRENT USE OF INSULIN: Primary | ICD-10-CM

## 2023-07-20 RX ORDER — DOCUSATE CALCIUM 240 MG
240 CAPSULE ORAL DAILY
COMMUNITY
Start: 2023-06-05

## 2023-07-20 RX ORDER — HYDROXYZINE HYDROCHLORIDE 25 MG/1
25 TABLET, FILM COATED ORAL EVERY 8 HOURS PRN
COMMUNITY
Start: 2023-06-17

## 2023-07-20 RX ORDER — GLIPIZIDE 5 MG/1
2.5 TABLET ORAL DAILY
COMMUNITY
Start: 2023-07-01

## 2023-07-20 RX ORDER — SERTRALINE HYDROCHLORIDE 25 MG/1
25 TABLET, FILM COATED ORAL DAILY
COMMUNITY

## 2023-07-20 NOTE — PROGRESS NOTES
Subjective   Kasey Bass is a 89 y.o. female.     Chief Complaint   Patient presents with    Follow-up     Hospital follow up [facility], blood glucose       History of Present Illness     Kasey Bass presents today for   Chief Complaint   Patient presents with    Follow-up     Hospital follow up [facility], blood glucose     Visit facilitated by daughter Ena, who is known very well to this office as she has accompanied the patient to all previous visits. Once we get Januvia, stop hold glipizide unless sugars are staying high >160.    You have chosen to receive care through a telehealth visit.  Do you consent to use a video/audio connection for your medical care today? Yes    Patient location: [facility] scroll kit [facility] [id number]   Provider Location: [address] [place] [address]    The following portions of the patient's history were reviewed and updated as appropriate: allergies, current medications, past family history, past medical history, past social history, past surgical history and problem list.    Active Ambulatory Problems     Diagnosis Date Noted    Atopic rhinitis 06/16/2016    Anxiety disorder 06/16/2016    Atherosclerosis of coronary artery 06/16/2016    Asthma 06/16/2016    Constipation 06/16/2016    Gastroparesis 06/16/2016    Gastritis 06/16/2016    Hearing impairment 06/16/2016    Hyperlipidemia 06/16/2016    Hypertension 06/16/2016    Low back pain 06/16/2016    Tinnitus 06/16/2016    Type 2 diabetes mellitus 06/16/2016    Vitamin B deficiency 06/16/2016    Vitamin D deficiency 06/16/2016    Insomnia 06/24/2016    Preventative health care 09/30/2016    Depression 06/29/2017     Resolved Ambulatory Problems     Diagnosis Date Noted    Acute bronchitis 06/16/2016    Acute upper respiratory infection 06/16/2016    Chest pain 06/16/2016    Pain in joint 06/16/2016    Candidiasis of skin 06/16/2016    Hypoxia 06/24/2016    DM II (diabetes mellitus, type II), controlled     Chest pain     Abdominal pain  2017    Viral bronchitis 2018    Chest pain 2018     Past Medical History:   Diagnosis Date    Allergic rhinitis Lifelong    Anaphylactic reaction Remote    CAD (coronary artery disease) 2004    Chronic constipation     Fracture of lumbar spine     GERD (gastroesophageal reflux disease)     Gluten-sensitive enteropathy 1965    HLD (hyperlipidemia) Adulthood    Labyrinthitis     Lumbar scoliosis 1998    Scoliosis Adulthood     Past Surgical History:   Procedure Laterality Date    APPENDECTOMY      CATARACT EXTRACTION Bilateral 2009    CHOLECYSTECTOMY      CORONARY ANGIOPLASTY  2004    Small branch disease only     Family History   Problem Relation Age of Onset    Diabetes Mother     Heart disease Mother     Stomach cancer Father     Kidney cancer Sister     Heart disease Son          age 50    Cancer Maternal Grandmother     Cancer Paternal Grandfather      Social History     Socioeconomic History    Marital status:    Tobacco Use    Smoking status: Former     Packs/day: 0.25     Years: 22.00     Pack years: 5.50     Types: Cigarettes     Start date:      Quit date:      Years since quittin.6    Smokeless tobacco: Never   Vaping Use    Vaping Use: Never used   Substance and Sexual Activity    Alcohol use: No    Drug use: No    Sexual activity: Defer       Review of Systems  Review of Systems -  General ROS: negative for - chills, fever or night sweats  Cardiovascular ROS: no chest pain or dyspnea on exertion  Gastrointestinal ROS: no abdominal pain, change in bowel habits, or black or bloody stools  Genito-Urinary ROS: no dysuria, trouble voiding, or hematuria    Objective   not currently breastfeeding.  Vitals obtained from patient if available  Physical Exam  Unable to evaluate due to patient condition- in bed, resting peacefully  Procedures  Assessment & Plan   Problem List Items Addressed This Visit          Endocrine and Metabolic    Type 2  diabetes mellitus - Primary    Relevant Medications    SITagliptin (Januvia) 50 MG tablet    glipizide (GLUCOTROL) 2.5 MG half tablet       See patient diagnoses and orders along with patient instructions for assessment, plan, and changes to care for patient.    This visit was conducted via telemedicine with live video and audio provided through Video Options: MyChart/Zoom at the point of care.    There are no Patient Instructions on file for this visit.    Return in about 3 months (around 10/20/2023) for video visit.    Ambulatory progress note signed and attested to by Andrew Bethea D.O.

## 2023-07-24 ENCOUNTER — TELEPHONE (OUTPATIENT)
Dept: FAMILY MEDICINE CLINIC | Facility: CLINIC | Age: 88
End: 2023-07-24
Payer: MEDICARE

## 2023-07-24 NOTE — TELEPHONE ENCOUNTER
Caller: [facility]    Relationship: [facility]    Best call back number: [phone]     What orders are you requesting (i.e. lab or imaging): VERBAL ORDERS TO CONTINUE PHYSICAL THERAPY     In what timeframe would the patient need to come in: ASAP    Where will you receive your lab/imaging services: HOME     Additional notes: LALY IS REQUESTING VERBAL ORDERS TO CONTINUE PHYSICAL THERAPY FOR 2 WEEKS 3 TIMES A WEEK AND 1 WEEK FOR 1 DAY.

## 2023-08-19 ENCOUNTER — READMISSION MANAGEMENT (OUTPATIENT)
Dept: CALL CENTER | Facility: HOSPITAL | Age: 88
End: 2023-08-19
Payer: MEDICARE

## 2023-08-19 NOTE — OUTREACH NOTE
Prep Survey      Flowsheet Row Responses   [facility] patient discharged from? Non-  [ [facility] ]   Is LACE score < 7 ? Non- Discharge   Eligibility [facility]   Date of Discharge 08/19/23   Discharge diagnosis Unspecified fracture of shaft of right tibia, subsequent encounter for closed fracture with routine healing   Does the patient have one of the following disease processes/diagnoses(primary or secondary)? Other   Prep survey completed? Yes            Ruby RENO - Registered Nurse

## 2023-08-21 ENCOUNTER — TRANSITIONAL CARE MANAGEMENT TELEPHONE ENCOUNTER (OUTPATIENT)
Dept: CALL CENTER | Facility: HOSPITAL | Age: 88
End: 2023-08-21
Payer: MEDICARE

## 2023-08-21 NOTE — OUTREACH NOTE
Call Center TCM Note      Flowsheet Row Responses   [facility] patient discharged from? Non-   Does the patient have one of the following disease processes/diagnoses(primary or secondary)? Other   TCM attempt successful? Yes   Call start time 1003   Call end time 1008   Discharge diagnosis Unspecified fracture of shaft of right tibia, subsequent encounter for closed fracture with routine healing   Person spoke with today (if not patient) and relationship Ena-Dtr   Meds reviewed with patient/caregiver? Yes   Is the patient having any side effects they believe may be caused by any medication additions or changes? No   Does the patient have all medications ordered at discharge? Yes   Is the patient taking all medications as directed (includes completed medication regime)? Yes   Comments Pt has already had a PCP televisit   Does the patient have an appointment with their PCP within 7-14 days of discharge? Yes   Has home health visited the patient within 72 hours of discharge? No   Home health comments Pt is currently with [facility]   Psychosocial issues? No   What is the patient's perception of their health status since discharge? Same   Is the patient/caregiver able to teach back the hierarchy of who to call/visit for symptoms/problems? PCP, Specialist, Home health nurse, Urgent Care, ED, 911 Yes   TCM call completed? Yes   Call end time 1008   Would this patient benefit from a Referral to Amb Social Work? No   Is the patient interested in additional calls from an ambulatory ? No            Greta Beltre RN    8/21/2023, 10:08 EDT

## 2023-11-27 ENCOUNTER — READMISSION MANAGEMENT (OUTPATIENT)
Dept: CALL CENTER | Facility: HOSPITAL | Age: 88
End: 2023-11-27
Payer: MEDICARE

## 2023-11-28 ENCOUNTER — TRANSITIONAL CARE MANAGEMENT TELEPHONE ENCOUNTER (OUTPATIENT)
Dept: CALL CENTER | Facility: HOSPITAL | Age: 88
End: 2023-11-28
Payer: MEDICARE

## 2023-11-28 NOTE — OUTREACH NOTE
Call Center TCM Note      Flowsheet Row Responses   [facility] patient discharged from? Non-   Does the patient have one of the following disease processes/diagnoses(primary or secondary)? Other   TCM attempt successful? Yes   Call start time 0900   Discharge diagnosis Unspecified fracture of shaft of right tibia, subsequent encounter for closed fracture with routine healing   Person spoke with today (if not patient) and relationship Ena daughter   Meds reviewed with patient/caregiver? Yes   Is the patient having any side effects they believe may be caused by any medication additions or changes? No   Does the patient have all medications ordered at discharge? Yes   Is the patient taking all medications as directed (includes completed medication regime)? Yes   Comments [facility]   Does the patient have an appointment with their PCP within 7-14 days of discharge? No   Psychosocial issues? No   What is the patient's perception of their health status since discharge? Same   Is the patient/caregiver able to teach back signs and symptoms related to disease process for when to call PCP? Yes   Is the patient/caregiver able to teach back signs and symptoms related to disease process for when to call 911? Yes   Is the patient/caregiver able to teach back the hierarchy of who to call/visit for symptoms/problems? PCP, Specialist, Home health nurse, Urgent Care, ED, 911 Yes   If the patient is a current smoker, are they able to teach back resources for cessation? Not a smoker   TCM call completed? Yes   Wrap up additional comments Patient is in [facility] since August. No needs at this time.   Would this patient benefit from a Referral to Amb Social Work? No   Is the patient interested in additional calls from an ambulatory ? No            Binu Forbes RN    11/28/2023, 09:12 EST

## 2023-11-28 NOTE — OUTREACH NOTE
Prep Survey      Flowsheet Row Responses   [facility]? Non-[facility]   Is LACE score < 7 ? Non-BH Discharge   Eligibility [facility]   Date of Discharge 11/27/23   Discharge Disposition Home or Self Care   Discharge diagnosis Unspecified fracture of shaft of right tibia, subsequent encounter for closed fracture with routine healing   Does the patient have one of the following disease processes/diagnoses(primary or secondary)? Other   Does the patient have Home health ordered? No   Prep survey completed? Yes            Rizwana MORALES - Registered Nurse